# Patient Record
Sex: FEMALE | Race: WHITE | NOT HISPANIC OR LATINO | Employment: OTHER | URBAN - METROPOLITAN AREA
[De-identification: names, ages, dates, MRNs, and addresses within clinical notes are randomized per-mention and may not be internally consistent; named-entity substitution may affect disease eponyms.]

---

## 2017-03-21 RX ORDER — LEVOTHYROXINE SODIUM 0.1 MG/1
100 TABLET ORAL DAILY
Qty: 90 TABLET | Refills: 0 | Status: SHIPPED | OUTPATIENT
Start: 2017-03-21 | End: 2017-06-03 | Stop reason: SDUPTHER

## 2017-03-29 DIAGNOSIS — Z00.00 ROUTINE GENERAL MEDICAL EXAMINATION AT A HEALTH CARE FACILITY: Primary | ICD-10-CM

## 2017-03-30 ENCOUNTER — LAB (OUTPATIENT)
Dept: SPORTS MEDICINE | Facility: CLINIC | Age: 62
End: 2017-03-30

## 2017-03-30 DIAGNOSIS — Z00.00 ROUTINE GENERAL MEDICAL EXAMINATION AT A HEALTH CARE FACILITY: ICD-10-CM

## 2017-03-30 LAB
ALBUMIN SERPL-MCNC: 4 G/DL (ref 3.5–5.2)
ALBUMIN/GLOB SERPL: 1.6 G/DL
ALP SERPL-CCNC: 79 U/L (ref 39–117)
ALT SERPL-CCNC: 13 U/L (ref 1–33)
AST SERPL-CCNC: 18 U/L (ref 1–32)
BASOPHILS # BLD AUTO: 0.01 10*3/MM3 (ref 0–0.2)
BASOPHILS NFR BLD AUTO: 0.2 % (ref 0–1.5)
BILIRUB SERPL-MCNC: 0.4 MG/DL (ref 0.1–1.2)
BUN SERPL-MCNC: 18 MG/DL (ref 8–23)
BUN/CREAT SERPL: 19.6 (ref 7–25)
CALCIUM SERPL-MCNC: 9.4 MG/DL (ref 8.6–10.5)
CHLORIDE SERPL-SCNC: 106 MMOL/L (ref 98–107)
CHOLEST SERPL-MCNC: 232 MG/DL (ref 0–200)
CHOLEST/HDLC SERPL: 4.14 {RATIO}
CO2 SERPL-SCNC: 24.8 MMOL/L (ref 22–29)
CREAT SERPL-MCNC: 0.92 MG/DL (ref 0.57–1)
EOSINOPHIL # BLD AUTO: 0.13 10*3/MM3 (ref 0–0.7)
EOSINOPHIL NFR BLD AUTO: 3 % (ref 0.3–6.2)
ERYTHROCYTE [DISTWIDTH] IN BLOOD BY AUTOMATED COUNT: 14.9 % (ref 11.7–13)
GLOBULIN SER CALC-MCNC: 2.5 GM/DL
GLUCOSE SERPL-MCNC: 86 MG/DL (ref 65–99)
HCT VFR BLD AUTO: 41.2 % (ref 35.6–45.5)
HDLC SERPL-MCNC: 56 MG/DL (ref 40–60)
HGB BLD-MCNC: 12.9 G/DL (ref 11.9–15.5)
IMM GRANULOCYTES # BLD: 0 10*3/MM3 (ref 0–0.03)
IMM GRANULOCYTES NFR BLD: 0 % (ref 0–0.5)
LDLC SERPL CALC-MCNC: 156 MG/DL (ref 0–100)
LYMPHOCYTES # BLD AUTO: 1.08 10*3/MM3 (ref 0.9–4.8)
LYMPHOCYTES NFR BLD AUTO: 25.3 % (ref 19.6–45.3)
MCH RBC QN AUTO: 27.9 PG (ref 26.9–32)
MCHC RBC AUTO-ENTMCNC: 31.3 G/DL (ref 32.4–36.3)
MCV RBC AUTO: 89 FL (ref 80.5–98.2)
MONOCYTES # BLD AUTO: 0.33 10*3/MM3 (ref 0.2–1.2)
MONOCYTES NFR BLD AUTO: 7.7 % (ref 5–12)
NEUTROPHILS # BLD AUTO: 2.72 10*3/MM3 (ref 1.9–8.1)
NEUTROPHILS NFR BLD AUTO: 63.8 % (ref 42.7–76)
PLATELET # BLD AUTO: 225 10*3/MM3 (ref 140–500)
POTASSIUM SERPL-SCNC: 4.4 MMOL/L (ref 3.5–5.2)
PROT SERPL-MCNC: 6.5 G/DL (ref 6–8.5)
RBC # BLD AUTO: 4.63 10*6/MM3 (ref 3.9–5.2)
SODIUM SERPL-SCNC: 143 MMOL/L (ref 136–145)
T4 FREE SERPL-MCNC: 1.41 NG/DL (ref 0.93–1.7)
TRIGL SERPL-MCNC: 98 MG/DL (ref 0–150)
TSH SERPL DL<=0.005 MIU/L-ACNC: 2.47 MIU/ML (ref 0.27–4.2)
VLDLC SERPL CALC-MCNC: 19.6 MG/DL (ref 5–40)
WBC # BLD AUTO: 4.27 10*3/MM3 (ref 4.5–10.7)

## 2017-04-06 ENCOUNTER — OFFICE VISIT (OUTPATIENT)
Dept: SPORTS MEDICINE | Facility: CLINIC | Age: 62
End: 2017-04-06

## 2017-04-06 VITALS
SYSTOLIC BLOOD PRESSURE: 112 MMHG | TEMPERATURE: 97.8 F | OXYGEN SATURATION: 97 % | DIASTOLIC BLOOD PRESSURE: 80 MMHG | BODY MASS INDEX: 28.25 KG/M2 | WEIGHT: 186.4 LBS | HEART RATE: 81 BPM | HEIGHT: 68 IN

## 2017-04-06 DIAGNOSIS — E03.9 ADULT HYPOTHYROIDISM: ICD-10-CM

## 2017-04-06 DIAGNOSIS — Z00.00 HEALTHCARE MAINTENANCE: Primary | ICD-10-CM

## 2017-04-06 PROCEDURE — 99396 PREV VISIT EST AGE 40-64: CPT | Performed by: FAMILY MEDICINE

## 2017-04-06 NOTE — PROGRESS NOTES
"Subjective   Iris Kumar is a 61 y.o. female.   Chief Complaint   Patient presents with   • Annual Exam       History of Present Illness   1.  CPE  2.  No c/o with thyroid medicine    I have reviewed the patient's medical history in detail and updated the computerized patient record.        Review of Systems   Constitutional: Negative for activity change, appetite change, chills, diaphoresis, fatigue, fever and unexpected weight change.   HENT: Negative for congestion, ear pain, postnasal drip, rhinorrhea, sinus pressure, sneezing, sore throat and trouble swallowing.    Eyes: Negative for visual disturbance.   Respiratory: Negative for cough, chest tightness, shortness of breath and wheezing.    Cardiovascular: Negative for chest pain and palpitations.   Gastrointestinal: Negative for abdominal pain, blood in stool, nausea and vomiting.   Endocrine: Negative for cold intolerance, polydipsia, polyphagia and polyuria.   Genitourinary: Negative for dysuria, flank pain, frequency, hematuria and urgency.   Musculoskeletal: Negative for arthralgias, back pain, joint swelling and myalgias.   Skin: Negative for rash.   Allergic/Immunologic: Negative for environmental allergies.   Neurological: Negative for dizziness, syncope, weakness, numbness and headaches.   Hematological: Negative for adenopathy. Does not bruise/bleed easily.   Psychiatric/Behavioral: Negative for agitation, decreased concentration, dysphoric mood, sleep disturbance and suicidal ideas. The patient is not nervous/anxious.    /80  Pulse 81  Temp 97.8 °F (36.6 °C)  Ht 68\" (172.7 cm)  Wt 186 lb 6.4 oz (84.6 kg)  SpO2 97%  BMI 28.34 kg/m2      Objective   Physical Exam   Constitutional: She is oriented to person, place, and time. She appears well-developed and well-nourished.   HENT:   Head: Normocephalic and atraumatic.   Right Ear: External ear normal.   Left Ear: External ear normal.   Nose: Nose normal.   Mouth/Throat: Oropharynx is clear " and moist.   Eyes: Conjunctivae and EOM are normal. Pupils are equal, round, and reactive to light.   Neck: Normal range of motion. Neck supple. No thyromegaly present.   Cardiovascular: Normal rate, regular rhythm and normal heart sounds.    No peripheral edema   Pulmonary/Chest: Effort normal and breath sounds normal.   Neurological: She is alert and oriented to person, place, and time.   Skin: Skin is warm, dry and intact.   Psychiatric: She has a normal mood and affect. Her behavior is normal. Thought content normal.   Vitals reviewed.      Assessment/Plan   Iris was seen today for annual exam.    Diagnoses and all orders for this visit:    Healthcare maintenance    Adult hypothyroidism      Reviewed labs from 3/30/2017, continue her cardiac risk score low at 3.0%.  Thyroid levels normal, continue current dose.  Patient will have screening colonoscopy next year.

## 2017-04-17 RX ORDER — LEVOTHYROXINE SODIUM 0.1 MG/1
TABLET ORAL
Qty: 30 TABLET | Refills: 3 | Status: SHIPPED | OUTPATIENT
Start: 2017-04-17 | End: 2019-01-11 | Stop reason: SDUPTHER

## 2017-06-05 RX ORDER — LEVOTHYROXINE SODIUM 0.1 MG/1
TABLET ORAL
Qty: 90 TABLET | Refills: 3 | Status: SHIPPED | OUTPATIENT
Start: 2017-06-05 | End: 2018-05-13 | Stop reason: SDUPTHER

## 2018-05-14 RX ORDER — LEVOTHYROXINE SODIUM 0.1 MG/1
TABLET ORAL
Qty: 90 TABLET | Refills: 3 | Status: SHIPPED | OUTPATIENT
Start: 2018-05-14 | End: 2019-01-11 | Stop reason: SDUPTHER

## 2018-08-13 ENCOUNTER — AMBULATORY SURGICAL CENTER (AMBULATORY)
Dept: URBAN - METROPOLITAN AREA SURGERY 17 | Facility: SURGERY | Age: 63
End: 2018-08-13
Payer: COMMERCIAL

## 2018-08-13 ENCOUNTER — OFFICE (AMBULATORY)
Dept: URBAN - METROPOLITAN AREA PATHOLOGY 4 | Facility: PATHOLOGY | Age: 63
End: 2018-08-13
Payer: COMMERCIAL

## 2018-08-13 VITALS
RESPIRATION RATE: 20 BRPM | HEART RATE: 63 BPM | RESPIRATION RATE: 15 BRPM | OXYGEN SATURATION: 98 % | OXYGEN SATURATION: 99 % | HEART RATE: 76 BPM | DIASTOLIC BLOOD PRESSURE: 83 MMHG | RESPIRATION RATE: 16 BRPM | HEART RATE: 69 BPM | WEIGHT: 185 LBS | HEART RATE: 84 BPM | OXYGEN SATURATION: 97 % | HEART RATE: 74 BPM | OXYGEN SATURATION: 94 % | DIASTOLIC BLOOD PRESSURE: 71 MMHG | DIASTOLIC BLOOD PRESSURE: 78 MMHG | SYSTOLIC BLOOD PRESSURE: 132 MMHG | HEART RATE: 78 BPM | RESPIRATION RATE: 18 BRPM | DIASTOLIC BLOOD PRESSURE: 69 MMHG | HEART RATE: 80 BPM | SYSTOLIC BLOOD PRESSURE: 115 MMHG | RESPIRATION RATE: 17 BRPM | SYSTOLIC BLOOD PRESSURE: 140 MMHG | HEIGHT: 67 IN | HEART RATE: 81 BPM | TEMPERATURE: 97 F | SYSTOLIC BLOOD PRESSURE: 125 MMHG | HEART RATE: 79 BPM | RESPIRATION RATE: 32 BRPM | SYSTOLIC BLOOD PRESSURE: 136 MMHG | SYSTOLIC BLOOD PRESSURE: 120 MMHG | SYSTOLIC BLOOD PRESSURE: 153 MMHG | TEMPERATURE: 98.6 F | OXYGEN SATURATION: 96 % | RESPIRATION RATE: 24 BRPM | SYSTOLIC BLOOD PRESSURE: 130 MMHG | DIASTOLIC BLOOD PRESSURE: 70 MMHG | SYSTOLIC BLOOD PRESSURE: 111 MMHG | OXYGEN SATURATION: 95 % | DIASTOLIC BLOOD PRESSURE: 57 MMHG | DIASTOLIC BLOOD PRESSURE: 76 MMHG

## 2018-08-13 DIAGNOSIS — K57.30 DIVERTICULOSIS OF LARGE INTESTINE WITHOUT PERFORATION OR ABS: ICD-10-CM

## 2018-08-13 DIAGNOSIS — D12.4 BENIGN NEOPLASM OF DESCENDING COLON: ICD-10-CM

## 2018-08-13 DIAGNOSIS — Z12.11 ENCOUNTER FOR SCREENING FOR MALIGNANT NEOPLASM OF COLON: ICD-10-CM

## 2018-08-13 DIAGNOSIS — K63.5 POLYP OF COLON: ICD-10-CM

## 2018-08-13 LAB
GI HISTOLOGY: A. SELECT: (no result)
GI HISTOLOGY: PDF REPORT: (no result)

## 2018-08-13 PROCEDURE — 88305 TISSUE EXAM BY PATHOLOGIST: CPT

## 2018-08-13 PROCEDURE — 45385 COLONOSCOPY W/LESION REMOVAL: CPT | Mod: 33

## 2018-12-27 DIAGNOSIS — Z13.29 SCREENING FOR THYROID DISORDER: ICD-10-CM

## 2018-12-27 DIAGNOSIS — Z00.00 HEALTH CARE MAINTENANCE: Primary | ICD-10-CM

## 2018-12-27 DIAGNOSIS — Z11.59 ENCOUNTER FOR HEPATITIS C SCREENING TEST FOR LOW RISK PATIENT: ICD-10-CM

## 2019-01-02 ENCOUNTER — RESULTS ENCOUNTER (OUTPATIENT)
Dept: SPORTS MEDICINE | Facility: CLINIC | Age: 64
End: 2019-01-02

## 2019-01-02 DIAGNOSIS — R79.89 ABNORMAL THYROID SCREEN (BLOOD): ICD-10-CM

## 2019-01-02 DIAGNOSIS — Z00.00 ANNUAL PHYSICAL EXAM: Primary | ICD-10-CM

## 2019-01-02 DIAGNOSIS — Z00.00 HEALTH CARE MAINTENANCE: ICD-10-CM

## 2019-01-02 DIAGNOSIS — Z13.29 SCREENING FOR THYROID DISORDER: ICD-10-CM

## 2019-01-02 DIAGNOSIS — Z11.59 ENCOUNTER FOR HEPATITIS C SCREENING TEST FOR LOW RISK PATIENT: ICD-10-CM

## 2019-01-02 DIAGNOSIS — E03.9 ADULT HYPOTHYROIDISM: ICD-10-CM

## 2019-01-02 DIAGNOSIS — Z11.59 NEED FOR HEPATITIS C SCREENING TEST: ICD-10-CM

## 2019-01-02 DIAGNOSIS — Z13.220 SCREENING CHOLESTEROL LEVEL: ICD-10-CM

## 2019-01-03 LAB
ALBUMIN SERPL-MCNC: 4.1 G/DL (ref 3.5–5.2)
ALBUMIN/GLOB SERPL: 1.5 G/DL
ALP SERPL-CCNC: 84 U/L (ref 39–117)
ALT SERPL-CCNC: 16 U/L (ref 1–33)
APPEARANCE UR: CLEAR
AST SERPL-CCNC: 17 U/L (ref 1–32)
BACTERIA #/AREA URNS HPF: ABNORMAL /HPF
BASOPHILS # BLD AUTO: 0.05 10*3/MM3 (ref 0–0.2)
BASOPHILS NFR BLD AUTO: 1.1 % (ref 0–1.5)
BILIRUB SERPL-MCNC: 0.4 MG/DL (ref 0.1–1.2)
BILIRUB UR QL STRIP: NEGATIVE
BUN SERPL-MCNC: 13 MG/DL (ref 8–23)
BUN/CREAT SERPL: 15.3 (ref 7–25)
CALCIUM SERPL-MCNC: 9.5 MG/DL (ref 8.6–10.5)
CHLORIDE SERPL-SCNC: 107 MMOL/L (ref 98–107)
CHOLEST SERPL-MCNC: 209 MG/DL (ref 0–200)
CHOLEST/HDLC SERPL: 4.45 {RATIO}
CO2 SERPL-SCNC: 26 MMOL/L (ref 22–29)
COLOR UR: YELLOW
CREAT SERPL-MCNC: 0.85 MG/DL (ref 0.57–1)
EOSINOPHIL # BLD AUTO: 0.14 10*3/MM3 (ref 0–0.7)
EOSINOPHIL NFR BLD AUTO: 3.1 % (ref 0.3–6.2)
EPI CELLS #/AREA URNS HPF: ABNORMAL /HPF
ERYTHROCYTE [DISTWIDTH] IN BLOOD BY AUTOMATED COUNT: 15.1 % (ref 11.7–13)
GLOBULIN SER CALC-MCNC: 2.7 GM/DL
GLUCOSE SERPL-MCNC: 89 MG/DL (ref 65–99)
GLUCOSE UR QL: NEGATIVE
HCT VFR BLD AUTO: 38.6 % (ref 35.6–45.5)
HCV AB S/CO SERPL IA: <0.1 S/CO RATIO (ref 0–0.9)
HDLC SERPL-MCNC: 47 MG/DL (ref 40–60)
HGB BLD-MCNC: 12.4 G/DL (ref 11.9–15.5)
HGB UR QL STRIP: ABNORMAL
IMM GRANULOCYTES # BLD: 0 10*3/MM3 (ref 0–0.03)
IMM GRANULOCYTES NFR BLD: 0 % (ref 0–0.5)
KETONES UR QL STRIP: NEGATIVE
LDLC SERPL CALC-MCNC: 133 MG/DL (ref 0–100)
LEUKOCYTE ESTERASE UR QL STRIP: NEGATIVE
LYMPHOCYTES # BLD AUTO: 1.03 10*3/MM3 (ref 0.9–4.8)
LYMPHOCYTES NFR BLD AUTO: 22.8 % (ref 19.6–45.3)
MCH RBC QN AUTO: 27.3 PG (ref 26.9–32)
MCHC RBC AUTO-ENTMCNC: 32.1 G/DL (ref 32.4–36.3)
MCV RBC AUTO: 85 FL (ref 80.5–98.2)
MICRO URNS: ABNORMAL
MONOCYTES # BLD AUTO: 0.38 10*3/MM3 (ref 0.2–1.2)
MONOCYTES NFR BLD AUTO: 8.4 % (ref 5–12)
MUCOUS THREADS URNS QL MICRO: PRESENT /HPF
NEUTROPHILS # BLD AUTO: 2.91 10*3/MM3 (ref 1.9–8.1)
NEUTROPHILS NFR BLD AUTO: 64.6 % (ref 42.7–76)
NITRITE UR QL STRIP: NEGATIVE
PH UR STRIP: 7 [PH] (ref 5–7.5)
PLATELET # BLD AUTO: 249 10*3/MM3 (ref 140–500)
POTASSIUM SERPL-SCNC: 4.6 MMOL/L (ref 3.5–5.2)
PROT SERPL-MCNC: 6.8 G/DL (ref 6–8.5)
PROT UR QL STRIP: NEGATIVE
RBC # BLD AUTO: 4.54 10*6/MM3 (ref 3.9–5.2)
RBC #/AREA URNS HPF: ABNORMAL /HPF
SODIUM SERPL-SCNC: 141 MMOL/L (ref 136–145)
SP GR UR: 1.02 (ref 1–1.03)
T4 FREE SERPL-MCNC: 1.44 NG/DL (ref 0.93–1.7)
TRIGL SERPL-MCNC: 145 MG/DL (ref 0–150)
TSH SERPL DL<=0.005 MIU/L-ACNC: 4.48 MIU/ML (ref 0.27–4.2)
URINALYSIS REFLEX: ABNORMAL
UROBILINOGEN UR STRIP-MCNC: 0.2 MG/DL (ref 0.2–1)
VLDLC SERPL CALC-MCNC: 29 MG/DL (ref 5–40)
WBC # BLD AUTO: 4.51 10*3/MM3 (ref 4.5–10.7)
WBC #/AREA URNS HPF: ABNORMAL /HPF

## 2019-01-11 ENCOUNTER — OFFICE VISIT (OUTPATIENT)
Dept: SPORTS MEDICINE | Facility: CLINIC | Age: 64
End: 2019-01-11

## 2019-01-11 VITALS
OXYGEN SATURATION: 97 % | WEIGHT: 190 LBS | DIASTOLIC BLOOD PRESSURE: 82 MMHG | HEART RATE: 74 BPM | TEMPERATURE: 97.7 F | HEIGHT: 68 IN | SYSTOLIC BLOOD PRESSURE: 130 MMHG | BODY MASS INDEX: 28.79 KG/M2

## 2019-01-11 DIAGNOSIS — Z00.00 PREVENTATIVE HEALTH CARE: Primary | ICD-10-CM

## 2019-01-11 DIAGNOSIS — E03.9 ADULT HYPOTHYROIDISM: ICD-10-CM

## 2019-01-11 DIAGNOSIS — R31.29 MICROSCOPIC HEMATURIA: ICD-10-CM

## 2019-01-11 PROBLEM — Q61.5 SPONGE KIDNEY: Status: ACTIVE | Noted: 2019-01-11

## 2019-01-11 PROCEDURE — 99396 PREV VISIT EST AGE 40-64: CPT | Performed by: FAMILY MEDICINE

## 2019-01-11 RX ORDER — LEVOTHYROXINE SODIUM 112 UG/1
112 TABLET ORAL DAILY
Qty: 90 TABLET | Refills: 3 | Status: SHIPPED | OUTPATIENT
Start: 2019-01-11 | End: 2020-01-06 | Stop reason: SDUPTHER

## 2019-01-11 RX ORDER — LEVOTHYROXINE SODIUM 112 UG/1
112 TABLET ORAL DAILY
Qty: 90 TABLET | Refills: 3 | Status: SHIPPED | OUTPATIENT
Start: 2019-01-11 | End: 2019-01-11 | Stop reason: SDUPTHER

## 2019-01-11 NOTE — PROGRESS NOTES
Iris Kumar is here today for an annual physical exam.     Eating a healthy diet. Exercising routinely.       I have reviewed the patient's medical, family, and social history in detail and updated the computerized patient record.    Screening history:  Colonoscopy - due 2023  Breast cancer, Pap/pelvic - per GYN  Metabolic - last year    Health Maintenance   Topic Date Due   • HEPATITIS A VACCINE ADULT (1 of 2) 09[birth year]/1973   • PAP SMEAR  04/06/2017   • ZOSTER VACCINE (2 of 2) 06/01/2017   • ANNUAL PHYSICAL  04/07/2018   • INFLUENZA VACCINE  08/01/2018   • MAMMOGRAM  11/14/2020   • COLONOSCOPY  08/13/2023   • TDAP/TD VACCINES (2 - Td) 04/06/2027   • HEPATITIS C SCREENING  Completed       Review of Systems   Constitutional: Negative for activity change, appetite change, chills, diaphoresis, fatigue, fever and unexpected weight change.   HENT: Negative for congestion, ear pain, postnasal drip, rhinorrhea, sinus pressure, sneezing, sore throat and trouble swallowing.    Eyes: Negative for visual disturbance.   Respiratory: Negative for cough, chest tightness, shortness of breath and wheezing.    Cardiovascular: Negative for chest pain and palpitations.   Gastrointestinal: Negative for abdominal pain, blood in stool, nausea and vomiting.   Endocrine: Negative for cold intolerance, polydipsia, polyphagia and polyuria.   Genitourinary: Negative for dysuria, flank pain, frequency, hematuria and urgency.   Musculoskeletal: Negative for arthralgias, back pain, joint swelling and myalgias.   Skin: Negative for rash.   Allergic/Immunologic: Negative for environmental allergies.   Neurological: Negative for dizziness, syncope, weakness, numbness and headaches.   Hematological: Negative for adenopathy. Does not bruise/bleed easily.   Psychiatric/Behavioral: Negative for agitation, decreased concentration, dysphoric mood, sleep disturbance and suicidal ideas. The patient is not nervous/anxious.        /82 (BP Location:  "Left arm, Patient Position: Sitting, Cuff Size: Adult)   Pulse 74   Temp 97.7 °F (36.5 °C) (Oral)   Ht 172.7 cm (67.99\")   Wt 86.2 kg (190 lb)   SpO2 97%   BMI 28.90 kg/m²      Physical Exam    Vital signs reviewed.  General appearance: No acute distress  Eyes: conjunctiva clear without erythema; pupils equally round and reactive  ENT: external ears and nose normal; hearing normal, oropharynx clear  Neck: supple; no thyromegaly  CV: normal rate and rhythm; no peripheral edema  Respiratory: normal respiratory effort; lungs clear to auscultation bilaterally  MSK: normal gait and station; no focal joint deformity or swelling  Skin: no rash or wounds; normal turgor  Neuro: cranial nerves 2-12 grossly intact; normal sensation to light touch  Psych: mood and affect normal; recent and remote memory intact    Results Encounter on 01/02/2019   Component Date Value Ref Range Status   • WBC 01/02/2019 4.51  4.50 - 10.70 10*3/mm3 Final   • RBC 01/02/2019 4.54  3.90 - 5.20 10*6/mm3 Final   • Hemoglobin 01/02/2019 12.4  11.9 - 15.5 g/dL Final   • Hematocrit 01/02/2019 38.6  35.6 - 45.5 % Final   • MCV 01/02/2019 85.0  80.5 - 98.2 fL Final   • MCH 01/02/2019 27.3  26.9 - 32.0 pg Final   • MCHC 01/02/2019 32.1* 32.4 - 36.3 g/dL Final   • RDW 01/02/2019 15.1* 11.7 - 13.0 % Final   • Platelets 01/02/2019 249  140 - 500 10*3/mm3 Final   • Neutrophil Rel % 01/02/2019 64.6  42.7 - 76.0 % Final   • Lymphocyte Rel % 01/02/2019 22.8  19.6 - 45.3 % Final   • Monocyte Rel % 01/02/2019 8.4  5.0 - 12.0 % Final   • Eosinophil Rel % 01/02/2019 3.1  0.3 - 6.2 % Final   • Basophil Rel % 01/02/2019 1.1  0.0 - 1.5 % Final   • Neutrophils Absolute 01/02/2019 2.91  1.90 - 8.10 10*3/mm3 Final   • Lymphocytes Absolute 01/02/2019 1.03  0.90 - 4.80 10*3/mm3 Final   • Monocytes Absolute 01/02/2019 0.38  0.20 - 1.20 10*3/mm3 Final   • Eosinophils Absolute 01/02/2019 0.14  0.00 - 0.70 10*3/mm3 Final   • Basophils Absolute 01/02/2019 0.05  0.00 - 0.20 " 10*3/mm3 Final   • Immature Granulocyte Rel % 01/02/2019 0.0  0.0 - 0.5 % Final   • Immature Grans Absolute 01/02/2019 0.00  0.00 - 0.03 10*3/mm3 Final   • Glucose 01/02/2019 89  65 - 99 mg/dL Final   • BUN 01/02/2019 13  8 - 23 mg/dL Final   • Creatinine 01/02/2019 0.85  0.57 - 1.00 mg/dL Final   • eGFR Non  Am 01/02/2019 68  >60 mL/min/1.73 Final   • eGFR African Am 01/02/2019 82  >60 mL/min/1.73 Final   • BUN/Creatinine Ratio 01/02/2019 15.3  7.0 - 25.0 Final   • Sodium 01/02/2019 141  136 - 145 mmol/L Final   • Potassium 01/02/2019 4.6  3.5 - 5.2 mmol/L Final   • Chloride 01/02/2019 107  98 - 107 mmol/L Final   • Total CO2 01/02/2019 26.0  22.0 - 29.0 mmol/L Final   • Calcium 01/02/2019 9.5  8.6 - 10.5 mg/dL Final   • Total Protein 01/02/2019 6.8  6.0 - 8.5 g/dL Final   • Albumin 01/02/2019 4.10  3.50 - 5.20 g/dL Final   • Globulin 01/02/2019 2.7  gm/dL Final   • A/G Ratio 01/02/2019 1.5  g/dL Final   • Total Bilirubin 01/02/2019 0.4  0.1 - 1.2 mg/dL Final   • Alkaline Phosphatase 01/02/2019 84  39 - 117 U/L Final   • AST (SGOT) 01/02/2019 17  1 - 32 U/L Final   • ALT (SGPT) 01/02/2019 16  1 - 33 U/L Final   • Hep C Virus Ab 01/02/2019 <0.1  0.0 - 0.9 s/co ratio Final    Comment:                                   Negative:     < 0.8                               Indeterminate: 0.8 - 0.9                                    Positive:     > 0.9   The CDC recommends that a positive HCV antibody result   be followed up with a HCV Nucleic Acid Amplification   test ([id number]).     • Total Cholesterol 01/02/2019 209* 0 - 200 mg/dL Final   • Triglycerides 01/02/2019 145  0 - 150 mg/dL Final   • HDL Cholesterol 01/02/2019 47  40 - 60 mg/dL Final   • VLDL Cholesterol 01/02/2019 29  5 - 40 mg/dL Final   • LDL Cholesterol  01/02/2019 133* 0 - 100 mg/dL Final   • Chol/HDL Ratio 01/02/2019 4.45   Final   • Free T4 01/02/2019 1.44  0.93 - 1.70 ng/dL Final   • TSH 01/02/2019 4.480* 0.270 - 4.200 mIU/mL Final   • Specific  Gravity, UA 01/02/2019 1.016  1.005 - 1.030 Final   • pH, UA 01/02/2019 7.0  5.0 - 7.5 Final   • Color, UA 01/02/2019 Yellow  Yellow Final   • Appearance, UA 01/02/2019 Clear  Clear Final   • Leukocytes, UA 01/02/2019 Negative  Negative Final   • Protein 01/02/2019 Negative  Negative/Trace Final   • Glucose, UA 01/02/2019 Negative  Negative Final   • Ketones 01/02/2019 Negative  Negative Final   • Blood, UA 01/02/2019 Trace* Negative Final   • Bilirubin, UA 01/02/2019 Negative  Negative Final   • Urobilinogen, UA 01/02/2019 0.2  0.2 - 1.0 mg/dL Final   • Nitrite, UA 01/02/2019 Negative  Negative Final   • Microscopic Examination 01/02/2019 See below:   Final    Microscopic was indicated and was performed.   • Urinalysis Reflex 01/02/2019 Comment   Final    This specimen will not reflex to a Urine Culture.   • WBC, UA 01/02/2019 0-5  0 - 5 /hpf Final   • RBC, UA 01/02/2019 3-10* 0 - 2 /hpf Final   • Epithelial Cells (non renal) 01/02/2019 0-10  0 - 10 /hpf Final   • Mucus, UA 01/02/2019 Present  Not Estab. /hpf Final   • Bacteria, UA 01/02/2019 Few  None seen/Few /hpf Final         Current Outpatient Medications:   •  Cholecalciferol (VITAMIN D-3) 1000 UNITS capsule, Take by mouth., Disp: , Rfl:   •  levothyroxine (SYNTHROID, LEVOTHROID) 112 MCG tablet, Take 1 tablet by mouth Daily., Disp: 90 tablet, Rfl: 3  •  sodium bicarbonate 325 MG tablet, Take by mouth., Disp: , Rfl:     Diagnoses and all orders for this visit:    Preventative health care    Adult hypothyroidism  -     levothyroxine (SYNTHROID, LEVOTHROID) 112 MCG tablet; Take 1 tablet by mouth Daily.  -     TSH; Future  -     T4, Free; Future    Microscopic hematuria      1.  We'll increase levothyroxine to 112 µg by mouth daily then plan to repeat TSH and free T4 in 4-6 weeks.  2.  Microscopic hematuria, patient has a history of sponge kidney, was to see her urologist yearly but she is behind on this.  She states she'll make an appointment with her  urologist.    Encourage healthy diet and exercise.  Encourage patient to stay up to date on screening examinations as indicated based on age and risk factors.    EMR Dragon/Transcription disclaimer:    Much of this encounter note is an electronic transcription/translation of spoken language to printed text.  The electronic translation of spoken language may permit erroneous, or at times, nonsensical words or phrases to be inadvertently transcribed.  Although I have reviewed the note for such errors some may still exist.

## 2019-02-11 ENCOUNTER — RESULTS ENCOUNTER (OUTPATIENT)
Dept: SPORTS MEDICINE | Facility: CLINIC | Age: 64
End: 2019-02-11

## 2019-02-11 DIAGNOSIS — E03.9 ADULT HYPOTHYROIDISM: ICD-10-CM

## 2019-03-13 LAB
T4 FREE SERPL-MCNC: 1.43 NG/DL (ref 0.93–1.7)
TSH SERPL DL<=0.005 MIU/L-ACNC: 1.48 MIU/ML (ref 0.27–4.2)

## 2019-03-20 ENCOUNTER — TELEPHONE (OUTPATIENT)
Dept: SPORTS MEDICINE | Facility: CLINIC | Age: 64
End: 2019-03-20

## 2020-01-06 DIAGNOSIS — E03.9 ADULT HYPOTHYROIDISM: Primary | ICD-10-CM

## 2020-01-06 RX ORDER — LEVOTHYROXINE SODIUM 0.1 MG/1
100 TABLET ORAL DAILY
Qty: 30 TABLET | Refills: 11 | Status: SHIPPED | OUTPATIENT
Start: 2020-01-06 | End: 2020-06-25 | Stop reason: SDUPTHER

## 2020-06-25 DIAGNOSIS — E03.9 ADULT HYPOTHYROIDISM: ICD-10-CM

## 2020-06-25 RX ORDER — LEVOTHYROXINE SODIUM 0.1 MG/1
100 TABLET ORAL DAILY
Qty: 30 TABLET | Refills: 11 | Status: SHIPPED | OUTPATIENT
Start: 2020-06-25 | End: 2021-02-26 | Stop reason: SDUPTHER

## 2021-02-18 DIAGNOSIS — Z00.00 MEDICARE ANNUAL WELLNESS VISIT, INITIAL: Primary | ICD-10-CM

## 2021-02-26 ENCOUNTER — OFFICE VISIT (OUTPATIENT)
Dept: SPORTS MEDICINE | Facility: CLINIC | Age: 66
End: 2021-02-26

## 2021-02-26 VITALS
HEART RATE: 75 BPM | SYSTOLIC BLOOD PRESSURE: 130 MMHG | WEIGHT: 189 LBS | OXYGEN SATURATION: 97 % | HEIGHT: 68 IN | BODY MASS INDEX: 28.64 KG/M2 | DIASTOLIC BLOOD PRESSURE: 72 MMHG | TEMPERATURE: 98.1 F

## 2021-02-26 DIAGNOSIS — E03.9 ADULT HYPOTHYROIDISM: ICD-10-CM

## 2021-02-26 DIAGNOSIS — Z00.00 PREVENTATIVE HEALTH CARE: Primary | ICD-10-CM

## 2021-02-26 DIAGNOSIS — E78.00 PURE HYPERCHOLESTEROLEMIA: Chronic | ICD-10-CM

## 2021-02-26 PROCEDURE — 99397 PER PM REEVAL EST PAT 65+ YR: CPT | Performed by: FAMILY MEDICINE

## 2021-02-26 PROCEDURE — 90732 PPSV23 VACC 2 YRS+ SUBQ/IM: CPT | Performed by: FAMILY MEDICINE

## 2021-02-26 PROCEDURE — 90471 IMMUNIZATION ADMIN: CPT | Performed by: FAMILY MEDICINE

## 2021-02-26 RX ORDER — LEVOTHYROXINE SODIUM 0.1 MG/1
100 TABLET ORAL DAILY
Qty: 90 TABLET | Refills: 3 | Status: SHIPPED | OUTPATIENT
Start: 2021-02-26 | End: 2022-04-12

## 2021-02-26 RX ORDER — ROSUVASTATIN CALCIUM 10 MG/1
10 TABLET, COATED ORAL DAILY
Qty: 90 TABLET | Refills: 3 | Status: SHIPPED | OUTPATIENT
Start: 2021-02-26 | End: 2022-05-16 | Stop reason: SDUPTHER

## 2021-02-26 RX ORDER — PHENOL 1.4 %
500 AEROSOL, SPRAY (ML) MUCOUS MEMBRANE 2 TIMES DAILY WITH MEALS
COMMUNITY

## 2021-02-26 NOTE — PROGRESS NOTES
"Iris Kumar is here today for an annual physical exam.     Eating a healthy diet. Exercising routinely.     PHQ-2 Depression Screening  Little interest or pleasure in doing things?  0   Feeling down, depressed, or hopeless?  0   PHQ-2 Total Score  0         I have reviewed the patient's medical, family, and social history in detail and updated the computerized patient record.    Screening history:  Colonoscopy -   Breast cancer, Pap/pelvic - per GYN  Metabolic - last year    Health Maintenance   Topic Date Due   • PAP SMEAR  04/06/2017   • ZOSTER VACCINE (2 of 2) 06/01/2017   • ANNUAL PHYSICAL  01/12/2020   • LIPID PANEL  02/19/2022   • MAMMOGRAM  01/25/2023   • DXA SCAN  02/09/2023   • COLONOSCOPY  08/13/2023   • Pneumococcal Vaccine 65+ (1 of 1 - PPSV23) 02/26/2026   • TDAP/TD VACCINES (2 - Td) 04/06/2027   • HEPATITIS C SCREENING  Completed   • INFLUENZA VACCINE  Completed   • MENINGOCOCCAL VACCINE  Aged Out       Review of Systems   Constitutional: Negative.    HENT: Negative.    Respiratory: Negative.    Cardiovascular: Negative.    Gastrointestinal: Negative.    Genitourinary: Negative.    Neurological: Negative.    Psychiatric/Behavioral: Negative.        /72 (BP Location: Left arm, Patient Position: Sitting, Cuff Size: Adult)   Pulse 75   Temp 98.1 °F (36.7 °C) (Oral)   Ht 172.7 cm (67.99\")   Wt 85.7 kg (189 lb)   SpO2 97%   BMI 28.74 kg/m²      Physical Exam    Vital signs reviewed.  General appearance: No acute distress  Eyes: conjunctiva clear without erythema; pupils equally round and reactive  ENT: external ears normal; hearing normal  Neck: supple; no thyromegaly  CV: normal rate and rhythm; no peripheral edema  Respiratory: normal respiratory effort; lungs clear to auscultation bilaterally  MSK: normal gait and station; no focal joint deformity or swelling  Skin: no rash or wounds; normal turgor  Neuro: cranial nerves 2-12 grossly intact; normal sensation to light touch  Psych: mood " and affect normal; recent and remote memory intact    Results Encounter on 02/19/2021   Component Date Value Ref Range Status   • Glucose 02/19/2021 88  65 - 99 mg/dL Final   • BUN 02/19/2021 12  8 - 23 mg/dL Final   • Creatinine 02/19/2021 0.83  0.57 - 1.00 mg/dL Final   • eGFR Non  Am 02/19/2021 69  >60 mL/min/1.73 Final    Comment: GFR Normal >60  Chronic Kidney Disease <60  Kidney Failure <15     • eGFR  Am 02/19/2021 84  >60 mL/min/1.73 Final   • BUN/Creatinine Ratio 02/19/2021 14.5  7.0 - 25.0 Final   • Sodium 02/19/2021 139  136 - 145 mmol/L Final   • Potassium 02/19/2021 4.4  3.5 - 5.2 mmol/L Final   • Chloride 02/19/2021 106  98 - 107 mmol/L Final   • Total CO2 02/19/2021 26.6  22.0 - 29.0 mmol/L Final   • Calcium 02/19/2021 9.2  8.6 - 10.5 mg/dL Final   • Total Protein 02/19/2021 6.1  6.0 - 8.5 g/dL Final   • Albumin 02/19/2021 3.80  3.50 - 5.20 g/dL Final   • Globulin 02/19/2021 2.3  gm/dL Final   • A/G Ratio 02/19/2021 1.7  g/dL Final   • Total Bilirubin 02/19/2021 0.4  0.0 - 1.2 mg/dL Final   • Alkaline Phosphatase 02/19/2021 89  39 - 117 U/L Final   • AST (SGOT) 02/19/2021 17  1 - 32 U/L Final   • ALT (SGPT) 02/19/2021 11  1 - 33 U/L Final   • Hemoglobin A1C 02/19/2021 5.50  4.80 - 5.60 % Final    Comment: Hemoglobin A1C Ranges:  Increased Risk for Diabetes  5.7% to 6.4%  Diabetes                     >= 6.5%  Diabetic Goal                < 7.0%     • Total Cholesterol 02/19/2021 200  0 - 200 mg/dL Final    Comment: Cholesterol Reference Ranges  (U.S. Department of Health and Human Services ATP III  Classifications)  Desirable          <200 mg/dL  Borderline High    200-239 mg/dL  High Risk          >240 mg/dL  Triglyceride Reference Ranges  (U.S. Department of Health and Human Services ATP III  Classifications)  Normal           <150 mg/dL  Borderline High  150-199 mg/dL  High             200-499 mg/dL  Very High        >500 mg/dL  HDL Reference Ranges  (U.S. Department of Health and  Human Services ATP III  Classifcations)  Low     <40 mg/dl (major risk factor for CHD)  High    >60 mg/dl ('negative' risk factor for CHD)  LDL Reference Ranges  (U.S. Department of Health and Human Services ATP III  Classifcations)  Optimal          <100 mg/dL  Near Optimal     100-129 mg/dL  Borderline High  130-159 mg/dL  High             160-189 mg/dL  Very High        >189 mg/dL     • Triglycerides 02/19/2021 130  0 - 150 mg/dL Final   • HDL Cholesterol 02/19/2021 40  40 - 60 mg/dL Final   • VLDL Cholesterol Ac 02/19/2021 24  5 - 40 mg/dL Final   • LDL Chol Calc ([facility]) 02/19/2021 136* 0 - 100 mg/dL Final   • Chol/HDL Ratio 02/19/2021 5.00   Final   • WBC 02/19/2021 4.72  3.40 - 10.80 10*3/mm3 Final   • RBC 02/19/2021 4.40  3.77 - 5.28 10*6/mm3 Final   • Hemoglobin 02/19/2021 12.2  12.0 - 15.9 g/dL Final   • Hematocrit 02/19/2021 37.7  34.0 - 46.6 % Final   • MCV 02/19/2021 85.7  79.0 - 97.0 fL Final   • MCH 02/19/2021 27.7  26.6 - 33.0 pg Final   • MCHC 02/19/2021 32.4  31.5 - 35.7 g/dL Final   • RDW 02/19/2021 14.0  12.3 - 15.4 % Final   • Platelets 02/19/2021 250  140 - 450 10*3/mm3 Final   • Neutrophil Rel % 02/19/2021 66.7  42.7 - 76.0 % Final   • Lymphocyte Rel % 02/19/2021 21.2  19.6 - 45.3 % Final   • Monocyte Rel % 02/19/2021 8.3  5.0 - 12.0 % Final   • Eosinophil Rel % 02/19/2021 2.8  0.3 - 6.2 % Final   • Basophil Rel % 02/19/2021 0.8  0.0 - 1.5 % Final   • Neutrophils Absolute 02/19/2021 3.15  1.70 - 7.00 10*3/mm3 Final   • Lymphocytes Absolute 02/19/2021 1.00  0.70 - 3.10 10*3/mm3 Final   • Monocytes Absolute 02/19/2021 0.39  0.10 - 0.90 10*3/mm3 Final   • Eosinophils Absolute 02/19/2021 0.13  0.00 - 0.40 10*3/mm3 Final   • Basophils Absolute 02/19/2021 0.04  0.00 - 0.20 10*3/mm3 Final   • Immature Granulocyte Rel % 02/19/2021 0.2  0.0 - 0.5 % Final   • Immature Grans Absolute 02/19/2021 0.01  0.00 - 0.05 10*3/mm3 Final   • nRBC 02/19/2021 0.0  0.0 - 0.2 /100 WBC Final   • Free T4 02/19/2021 1.37   0.93 - 1.70 ng/dL Final    Results may be falsely increased if patient taking Biotin.   • TSH 02/19/2021 1.750  0.270 - 4.200 uIU/mL Final   • Specific Gravity, UA 02/19/2021 1.007  1.005 - 1.030 Final   • pH, UA 02/19/2021 7.5  5.0 - 7.5 Final   • Color, UA 02/19/2021 Yellow  Yellow Final   • Appearance, UA 02/19/2021 Clear  Clear Final   • Leukocytes, UA 02/19/2021 Negative  Negative Final   • Protein 02/19/2021 Negative  Negative/Trace Final   • Glucose, UA 02/19/2021 Negative  Negative Final   • Ketones 02/19/2021 Negative  Negative Final   • Blood, UA 02/19/2021 Negative  Negative Final   • Bilirubin, UA 02/19/2021 Negative  Negative Final   • Urobilinogen, UA 02/19/2021 0.2  0.2 - 1.0 mg/dL Final   • Nitrite, UA 02/19/2021 Negative  Negative Final   • Microscopic Examination 02/19/2021 Comment   Final    Microscopic follows if indicated.   • Microscopic Examination 02/19/2021 See below:   Final    Microscopic was indicated and was performed.   • Urinalysis Reflex 02/19/2021 Comment   Final    This specimen will not reflex to a Urine Culture.   • Microalbumin, Urine 02/19/2021 <3.0  Not Estab. ug/mL Final    **Verified by repeat analysis**   • WBC, UA 02/19/2021 None seen  0 - 5 /hpf Final   • RBC, UA 02/19/2021 None seen  0 - 2 /hpf Final   • Epithelial Cells (non renal) 02/19/2021 0-10  0 - 10 /hpf Final   • Mucus, UA 02/19/2021 Present  Not Estab. /hpf Final   • Bacteria, UA 02/19/2021 None seen  None seen/Few /hpf Final         Current Outpatient Medications:   •  calcium carbonate (OS-HECTOR) 600 MG tablet, Take 750 mg by mouth Daily., Disp: , Rfl:   •  Cholecalciferol (VITAMIN D-3) 1000 UNITS capsule, Take by mouth., Disp: , Rfl:   •  levothyroxine (Synthroid) 100 MCG tablet, Take 1 tablet by mouth Daily., Disp: 90 tablet, Rfl: 3  •  sodium bicarbonate 325 MG tablet, Take by mouth., Disp: , Rfl:   •  rosuvastatin (CRESTOR) 10 MG tablet, Take 1 tablet by mouth Daily., Disp: 90 tablet, Rfl: 3    Diagnoses and  all orders for this visit:    1. Preventative health care (Primary)  -     Pneumococcal Polysaccharide Vaccine 23-Valent (PPSV23) Greater Than or Equal To 1yo Subcutaneous / IM    2. Pure hypercholesterolemia  -     rosuvastatin (CRESTOR) 10 MG tablet; Take 1 tablet by mouth Daily.  Dispense: 90 tablet; Refill: 3  -     Lipid Panel With / Chol / HDL Ratio; Future  -     Comprehensive Metabolic Panel; Future    3. Adult hypothyroidism  -     levothyroxine (Synthroid) 100 MCG tablet; Take 1 tablet by mouth Daily.  Dispense: 90 tablet; Refill: 3      Treat Cholesterol with Crestor 10 mg    Pneumovax today    Repeat lipid and CMP in 4-5 weeks.    Shingrix d/w patient      Encourage healthy diet and exercise.  Encourage patient to stay up to date on screening examinations as indicated based on age and risk factors.    EMR Dragon/Transcription disclaimer:    Much of this encounter note is an electronic transcription/translation of spoken language to printed text.  The electronic translation of spoken language may permit erroneous, or at times, nonsensical words or phrases to be inadvertently transcribed.  Although I have reviewed the note for such errors some may still exist.

## 2022-04-11 DIAGNOSIS — E03.9 ADULT HYPOTHYROIDISM: ICD-10-CM

## 2022-04-12 RX ORDER — LEVOTHYROXINE SODIUM 100 MCG
TABLET ORAL
Qty: 90 TABLET | Refills: 0 | Status: SHIPPED | OUTPATIENT
Start: 2022-04-12 | End: 2022-05-16 | Stop reason: SDUPTHER

## 2022-04-25 ENCOUNTER — TELEPHONE (OUTPATIENT)
Dept: SPORTS MEDICINE | Facility: CLINIC | Age: 67
End: 2022-04-25

## 2022-04-25 DIAGNOSIS — Z12.31 ENCOUNTER FOR SCREENING MAMMOGRAM FOR MALIGNANT NEOPLASM OF BREAST: Primary | ICD-10-CM

## 2022-04-25 NOTE — TELEPHONE ENCOUNTER
Caller: LEYLA SALDIVAR)      Best call back number: [phone]    What form or medical record are you requesting: MAMMOGRAM ORDER       How would you like to receive the form or medical records (pick-up, mail, fax):   If fax, what is the fax number: [phone]

## 2022-04-25 NOTE — TELEPHONE ENCOUNTER
Eva, with [facility] called requesting order for Bilateral mammo diagnostic screening. Pt is scheduled for tomorrow.    Please advise    Fax : [phone]

## 2022-04-26 DIAGNOSIS — Z12.31 ENCOUNTER FOR SCREENING MAMMOGRAM FOR MALIGNANT NEOPLASM OF BREAST: ICD-10-CM

## 2022-04-27 NOTE — PROGRESS NOTES
Called patient via phone and verbally relayed results and recommendations per Dr. Montaño. All information was verbally understood by the patient.     Thank you  Saima

## 2022-05-03 ENCOUNTER — OFFICE VISIT (OUTPATIENT)
Dept: FAMILY MEDICINE CLINIC | Facility: CLINIC | Age: 67
End: 2022-05-03

## 2022-05-03 VITALS
SYSTOLIC BLOOD PRESSURE: 132 MMHG | HEART RATE: 82 BPM | BODY MASS INDEX: 29.16 KG/M2 | WEIGHT: 192.4 LBS | OXYGEN SATURATION: 97 % | TEMPERATURE: 98.2 F | HEIGHT: 68 IN | DIASTOLIC BLOOD PRESSURE: 84 MMHG

## 2022-05-03 DIAGNOSIS — Q61.5 SPONGE KIDNEY: ICD-10-CM

## 2022-05-03 DIAGNOSIS — E03.9 ACQUIRED HYPOTHYROIDISM: Primary | ICD-10-CM

## 2022-05-03 DIAGNOSIS — E78.00 PURE HYPERCHOLESTEROLEMIA: ICD-10-CM

## 2022-05-03 PROCEDURE — 99203 OFFICE O/P NEW LOW 30 MIN: CPT | Performed by: NURSE PRACTITIONER

## 2022-05-03 NOTE — PROGRESS NOTES
"Chief Complaint  Establish Care (New pt)    Subjective          Iris Kumar presents to [facility]  History of Present Illness new patient here to establish care for hyperlipidemia and hypothyroidism.  Considers herself to be overall healthy.  She is single.  Has 1 daughter and 2 rescue dogs she adores.  She tries to follow healthy diet and is active.    She is taking Synthroid which she was originally started on.  Does not think she particularly had any problems with levothyroxine.  TSH has been stable.  She has no signs or symptoms of hypo or hyperthyroidism.    She recently developed hyperlipidemia and has been taking rosuvastatin without myalgia.    She has a history of microscopic hematuria.  Has seen urology a couple times over the years and has never had any known problems.  She had a complete work-up that was reportedly normal.  She does have a history of sponge kidney and was followed by nephrology but diagnosed so long ago and stable she has not seen nephrology.  She manages metabolic acidosis with calcium carbonate.    Had shingles vaccine done-jan and April.     Objective   Vital Signs:   /84   Pulse 82   Temp 98.2 °F (36.8 °C)   Ht 172.7 cm (68\")   Wt 87.3 kg (192 lb 6.4 oz)   SpO2 97%   BMI 29.25 kg/m²     Physical Exam  Vitals and nursing note reviewed.   Constitutional:       General: She is not in acute distress.     Appearance: She is well-developed. She is not ill-appearing or diaphoretic.   HENT:      Head: Normocephalic and atraumatic.   Eyes:      General:         Right eye: No discharge.         Left eye: No discharge.      Conjunctiva/sclera: Conjunctivae normal.   Cardiovascular:      Rate and Rhythm: Normal rate and regular rhythm.      Heart sounds: Normal heart sounds.   Pulmonary:      Effort: Pulmonary effort is normal.      Breath sounds: Normal breath sounds.   Abdominal:      General: Bowel sounds are normal.      Palpations: Abdomen is " soft.      Tenderness: There is no abdominal tenderness.   Musculoskeletal:         General: No deformity.      Comments: Gait smooth and steady   Skin:     General: Skin is warm and dry.   Neurological:      General: No focal deficit present.      Mental Status: She is alert and oriented to person, place, and time.   Psychiatric:         Attention and Perception: Attention and perception normal.         Mood and Affect: Mood and affect normal.         Speech: Speech normal.         Behavior: Behavior normal. Behavior is cooperative.         Thought Content: Thought content normal.         Cognition and Memory: Cognition normal.      Comments: Very pleasant, conversant, excellent medical historian        Result Review :                 Assessment and Plan    Diagnoses and all orders for this visit:    1. Acquired hypothyroidism (Primary)  -     TSH; Future    2. Pure hypercholesterolemia  -     Lipid Panel With LDL / HDL Ratio; Future    3. Sponge kidney  -     CBC & Differential; Future  -     Comprehensive Metabolic Panel; Future      Patient will return in a couple weeks for AWV and lab work.  Reviewed previous PCP labs and notes.  Her CMP showed normal electrolytes as well as renal function.  Liver function was normal.  Lipid panel seem to show significant improvement in lipids on rosuvastatin.  Most recent urinalysis did not show hematuria.  Her most recent TSH about a year ago was therapeutic.  Prior to that it was low.  She had a normal CBC.  Would continue current medications until next lab draw and adjust based on labs.               Follow Up   No follow-ups on file.  Patient was given instructions and counseling regarding her condition or for health maintenance advice. Please see specific information pulled into the AVS if appropriate.

## 2022-05-13 DIAGNOSIS — Q61.5 SPONGE KIDNEY: ICD-10-CM

## 2022-05-13 DIAGNOSIS — E78.00 PURE HYPERCHOLESTEROLEMIA: ICD-10-CM

## 2022-05-13 DIAGNOSIS — E03.9 ACQUIRED HYPOTHYROIDISM: ICD-10-CM

## 2022-05-14 LAB
ALBUMIN SERPL-MCNC: 4 G/DL (ref 3.8–4.8)
ALBUMIN/GLOB SERPL: 1.7 {RATIO} (ref 1.2–2.2)
ALP SERPL-CCNC: 75 IU/L (ref 44–121)
ALT SERPL-CCNC: 15 IU/L (ref 0–32)
AST SERPL-CCNC: 19 IU/L (ref 0–40)
BASOPHILS # BLD AUTO: 0.1 X10E3/UL (ref 0–0.2)
BASOPHILS NFR BLD AUTO: 1 %
BILIRUB SERPL-MCNC: 0.5 MG/DL (ref 0–1.2)
BUN SERPL-MCNC: 20 MG/DL (ref 8–27)
BUN/CREAT SERPL: 21 (ref 12–28)
CALCIUM SERPL-MCNC: 9 MG/DL (ref 8.7–10.3)
CHLORIDE SERPL-SCNC: 104 MMOL/L (ref 96–106)
CHOLEST SERPL-MCNC: 149 MG/DL (ref 100–199)
CO2 SERPL-SCNC: 20 MMOL/L (ref 20–29)
CREAT SERPL-MCNC: 0.95 MG/DL (ref 0.57–1)
EGFRCR SERPLBLD CKD-EPI 2021: 66 ML/MIN/1.73
EOSINOPHIL # BLD AUTO: 0.1 X10E3/UL (ref 0–0.4)
EOSINOPHIL NFR BLD AUTO: 3 %
ERYTHROCYTE [DISTWIDTH] IN BLOOD BY AUTOMATED COUNT: 14.5 % (ref 11.7–15.4)
GLOBULIN SER CALC-MCNC: 2.3 G/DL (ref 1.5–4.5)
GLUCOSE SERPL-MCNC: 88 MG/DL (ref 65–99)
HCT VFR BLD AUTO: 39.6 % (ref 34–46.6)
HDLC SERPL-MCNC: 52 MG/DL
HGB BLD-MCNC: 12.7 G/DL (ref 11.1–15.9)
IMM GRANULOCYTES # BLD AUTO: 0 X10E3/UL (ref 0–0.1)
IMM GRANULOCYTES NFR BLD AUTO: 0 %
LDLC SERPL CALC-MCNC: 84 MG/DL (ref 0–99)
LDLC/HDLC SERPL: 1.6 RATIO (ref 0–3.2)
LYMPHOCYTES # BLD AUTO: 1 X10E3/UL (ref 0.7–3.1)
LYMPHOCYTES NFR BLD AUTO: 24 %
MCH RBC QN AUTO: 27.1 PG (ref 26.6–33)
MCHC RBC AUTO-ENTMCNC: 32.1 G/DL (ref 31.5–35.7)
MCV RBC AUTO: 85 FL (ref 79–97)
MONOCYTES # BLD AUTO: 0.4 X10E3/UL (ref 0.1–0.9)
MONOCYTES NFR BLD AUTO: 9 %
NEUTROPHILS # BLD AUTO: 2.7 X10E3/UL (ref 1.4–7)
NEUTROPHILS NFR BLD AUTO: 63 %
PLATELET # BLD AUTO: 233 X10E3/UL (ref 150–450)
POTASSIUM SERPL-SCNC: 4.5 MMOL/L (ref 3.5–5.2)
PROT SERPL-MCNC: 6.3 G/DL (ref 6–8.5)
RBC # BLD AUTO: 4.68 X10E6/UL (ref 3.77–5.28)
SODIUM SERPL-SCNC: 141 MMOL/L (ref 134–144)
TRIGL SERPL-MCNC: 67 MG/DL (ref 0–149)
TSH SERPL DL<=0.005 MIU/L-ACNC: 0.94 UIU/ML (ref 0.45–4.5)
VLDLC SERPL CALC-MCNC: 13 MG/DL (ref 5–40)
WBC # BLD AUTO: 4.3 X10E3/UL (ref 3.4–10.8)

## 2022-05-16 ENCOUNTER — OFFICE VISIT (OUTPATIENT)
Dept: FAMILY MEDICINE CLINIC | Facility: CLINIC | Age: 67
End: 2022-05-16

## 2022-05-16 VITALS
HEIGHT: 68 IN | OXYGEN SATURATION: 98 % | WEIGHT: 193 LBS | DIASTOLIC BLOOD PRESSURE: 77 MMHG | BODY MASS INDEX: 29.25 KG/M2 | TEMPERATURE: 97.8 F | SYSTOLIC BLOOD PRESSURE: 131 MMHG | HEART RATE: 88 BPM

## 2022-05-16 DIAGNOSIS — Z00.00 INITIAL MEDICARE ANNUAL WELLNESS VISIT: Primary | ICD-10-CM

## 2022-05-16 DIAGNOSIS — E78.00 PURE HYPERCHOLESTEROLEMIA: Chronic | ICD-10-CM

## 2022-05-16 DIAGNOSIS — E03.9 ADULT HYPOTHYROIDISM: ICD-10-CM

## 2022-05-16 PROCEDURE — 1170F FXNL STATUS ASSESSED: CPT | Performed by: NURSE PRACTITIONER

## 2022-05-16 PROCEDURE — G0438 PPPS, INITIAL VISIT: HCPCS | Performed by: NURSE PRACTITIONER

## 2022-05-16 PROCEDURE — 1160F RVW MEDS BY RX/DR IN RCRD: CPT | Performed by: NURSE PRACTITIONER

## 2022-05-16 RX ORDER — ROSUVASTATIN CALCIUM 10 MG/1
10 TABLET, COATED ORAL DAILY
Qty: 90 TABLET | Refills: 1 | Status: SHIPPED | OUTPATIENT
Start: 2022-05-16 | End: 2022-11-18 | Stop reason: SDUPTHER

## 2022-05-16 RX ORDER — LEVOTHYROXINE SODIUM 0.1 MG/1
100 TABLET ORAL DAILY
Qty: 90 TABLET | Refills: 1 | Status: SHIPPED | OUTPATIENT
Start: 2022-05-16 | End: 2022-11-18 | Stop reason: SDUPTHER

## 2022-05-16 NOTE — PROGRESS NOTES
The ABCs of the Annual Wellness Visit  Initial Medicare Wellness Visit    Chief Complaint   Patient presents with   • Medicare Wellness-Initial Visit     AWV Initial      Subjective   History of Present Illness:  Iris Kumar is a 66 y.o. female who presents for an Initial Medicare Wellness Visit.    The following portions of the patient's history were reviewed and   updated as appropriate: allergies, current medications, past family history, past medical history, past social history, past surgical history and problem list.     Compared to one year ago, the patient feels her physical   health is the same.    Compared to one year ago, the patient feels her mental   health is the same.    Recent Hospitalizations:  She was not admitted to the hospital during the last year.       Current Medical Providers:  Patient Care Team:  Jenni Styles APRN as PCP - General (Nurse Practitioner)    Outpatient Medications Prior to Visit   Medication Sig Dispense Refill   • calcium carbonate (OS-HECTOR) 600 MG tablet Take 750 mg by mouth Daily.     • Cholecalciferol (VITAMIN D-3) 1000 UNITS capsule Take by mouth.     • sodium bicarbonate 325 MG tablet Take by mouth.     • rosuvastatin (CRESTOR) 10 MG tablet Take 1 tablet by mouth Daily. 90 tablet 3   • Synthroid 100 MCG tablet TAKE 1 TABLET DAILY 90 tablet 0     No facility-administered medications prior to visit.       No opioid medication identified on active medication list. I have reviewed chart for other potential  high risk medication/s and harmful drug interactions in the elderly.          Aspirin is not on active medication list.  Aspirin use is not indicated based on review of current medical condition/s. Risk of harm outweighs potential benefits.  .    Patient Active Problem List   Diagnosis   • Adult hypothyroidism   • Post-menopausal   • Tubular adenoma of colon   • Sponge kidney   • Pure hypercholesterolemia     Advance Care Planning  Advance Directive is not on  [de-identified] : Discussed the results of the patient's history, physical exam, and imaging with patient and his wife. Mr. Kaye has been suffering from neck pain and stiffness/burning paresthesias intermittently for years, now more constant over the last 2 months. MRI/CT cervical shows C4/C5 and C5/C6 disc osteophyte and posterior element hypertrophy resulting in severe central canal stenosis and cord compression, moderate/severe bilateral foraminal stenosis; cord signal change at these levels consistent with myelomalacia. Explained to patient that this will require surgical decompression. The patient is a candidate for C4/C5 ACDF, C6 corpectomy, C5-C7 anterior fusion/cage, C4-C7 anterior plate.  Further non operative treatment would include no surgery.  Risks, benefits, alternatives were discussed with the patient at great length, including but not limited to, bleeding, infection, dysphagia, odynophagia, recurrent laryngeal injury, hypoglossal nerve injury, dural tear, worsening neurologic status, persistent neurologic deficit, pseudoarthrosis, hardware migration/failure, and the need for further surgery. Although strict hospital protocols are in place, discussed the perioperative risk of christina COVID-19 during hospital stay. Patient understands and accepts risks.  The patient asked a number of cogent questions.  All questions were answered.  The patient demonstrated understanding of the risks, benefits, and alternatives.  The patient has elected to proceed with surgery. He will need cardiology, pulmonology, and medical clearances.\par \par  "file.  ACP discussion was held with the patient during this visit. Patient has an advance directive (not in EMR), copy requested.    Review of Systems   Constitutional: Negative for appetite change and fatigue.   HENT: Negative for congestion and trouble swallowing.    Eyes: Negative for pain and visual disturbance.   Respiratory: Negative for cough and shortness of breath.    Cardiovascular: Negative for chest pain and palpitations.   Gastrointestinal: Negative for abdominal pain, blood in stool, constipation, diarrhea, nausea and vomiting.   Genitourinary: Negative for difficulty urinating and hematuria.   Musculoskeletal: Negative for arthralgias and myalgias.   Skin: Negative for rash.   Neurological: Negative for dizziness.   Psychiatric/Behavioral: Negative for dysphoric mood and sleep disturbance. The patient is not nervous/anxious.         Objective       Vitals:    05/16/22 1309 05/16/22 1351   BP: 145/86 131/77   Pulse: 88    Temp: 97.8 °F (36.6 °C)    SpO2: 98%    Weight: 87.5 kg (193 lb)    Height: 172.7 cm (68\")      BMI Readings from Last 1 Encounters:   05/16/22 29.35 kg/m²   BMI is above normal parameters. Recommendations include: exercise counseling and nutrition counseling    Does the patient have evidence of cognitive impairment? Yes    Physical Exam  Vitals and nursing note reviewed.   Constitutional:       General: She is not in acute distress.     Appearance: She is well-developed. She is not ill-appearing.   HENT:      Head: Normocephalic and atraumatic.      Right Ear: Tympanic membrane, ear canal and external ear normal.      Left Ear: Tympanic membrane, ear canal and external ear normal.      Mouth/Throat:      Pharynx: Uvula midline.   Eyes:      General: No scleral icterus.        Right eye: No discharge.         Left eye: No discharge.      Conjunctiva/sclera: Conjunctivae normal.      Pupils: Pupils are equal, round, and reactive to light.   Neck:      Thyroid: No thyromegaly. "   Cardiovascular:      Rate and Rhythm: Normal rate and regular rhythm.      Heart sounds: Normal heart sounds. No murmur heard.  Pulmonary:      Effort: Pulmonary effort is normal.      Breath sounds: Normal breath sounds.   Abdominal:      General: Bowel sounds are normal.      Palpations: Abdomen is soft.      Tenderness: There is no abdominal tenderness.   Musculoskeletal:         General: No deformity.      Cervical back: Neck supple.      Comments: Gait smooth and steady   Lymphadenopathy:      Cervical: No cervical adenopathy.   Skin:     General: Skin is warm and dry.   Neurological:      General: No focal deficit present.      Mental Status: She is alert and oriented to person, place, and time.   Psychiatric:         Attention and Perception: Attention and perception normal.         Mood and Affect: Mood and affect normal.         Speech: Speech normal.         Behavior: Behavior normal. Behavior is cooperative.         Thought Content: Thought content normal.         Cognition and Memory: Cognition normal.      Comments: Very pleasant, conversant, well-dressed, good medical historian       Lab Results   Component Value Date    CHLPL 149 05/13/2022    TRIG 67 05/13/2022    HDL 52 05/13/2022    LDL 84 05/13/2022    VLDL 13 05/13/2022          HEALTH RISK ASSESSMENT    Smoking Status:  Social History     Tobacco Use   Smoking Status Former Smoker   • Packs/day: 1.00   • Years: 10.00   • Pack years: 10.00   Smokeless Tobacco Never Used   Tobacco Comment    quit 1992     Alcohol Consumption:  Social History     Substance and Sexual Activity   Alcohol Use Yes     Fall Risk Screen:    STEADI Fall Risk Assessment was completed, and patient is at LOW risk for falls.Assessment completed on:5/16/2022    Depression Screen:   PHQ-2/PHQ-9 Depression Screening 5/16/2022   Little Interest or Pleasure in Doing Things 0-->not at all   Feeling Down, Depressed or Hopeless 0-->not at all   PHQ-9: Brief Depression Severity  Measure Score 0       Health Habits and Functional and Cognitive Screening:  Functional & Cognitive Status 5/16/2022   Do you have difficulty preparing food and eating? No   Do you have difficulty bathing yourself, getting dressed or grooming yourself? No   Do you have difficulty using the toilet? No   Do you have difficulty moving around from place to place? No   Do you have trouble with steps or getting out of a bed or a chair? No   Current Diet Well Balanced Diet   Dental Exam Up to date   Eye Exam Up to date   Exercise (times per week) 7 times per week   Current Exercises Include Walking;Light Weights   Do you need help using the phone?  No   Are you deaf or do you have serious difficulty hearing?  No   Do you need help with transportation? No   Do you need help shopping? No   Do you need help preparing meals?  No   Do you need help with housework?  No   Do you need help with laundry? No   Do you need help taking your medications? No   Do you need help managing money? No   Do you ever drive or ride in a car without wearing a seat belt? No   Have you felt unusual stress, anger or loneliness in the last month? No   Who do you live with? Alone   If you need help, do you have trouble finding someone available to you? No   Do you have difficulty concentrating, remembering or making decisions? No       Age-appropriate Screening Schedule:  Refer to the list below for future screening recommendations based on patient's age, sex and/or medical conditions. Orders for these recommended tests are listed in the plan section. The patient has been provided with a written plan.    Health Maintenance   Topic Date Due   • PAP SMEAR  06/25/2022 (Originally 4/6/2017)   • INFLUENZA VACCINE  08/01/2022   • LIPID PANEL  05/13/2023   • MAMMOGRAM  04/26/2024   • DXA SCAN  05/03/2024   • TDAP/TD VACCINES (2 - Td or Tdap) 04/06/2027   • ZOSTER VACCINE  Addressed            Assessment & Plan   CMS Preventative Services Quick  Reference  Risk Factors Identified During Encounter  Obesity/Overweight   The above risks/problems have been discussed with the patient.  Follow up actions/plans if indicated are seen below in the Assessment/Plan Section.  Pertinent information has been shared with the patient in the After Visit Summary.    Diagnoses and all orders for this visit:    1. Initial Medicare annual wellness visit (Primary)    2. Pure hypercholesterolemia  -     rosuvastatin (CRESTOR) 10 MG tablet; Take 1 tablet by mouth Daily.  Dispense: 90 tablet; Refill: 1    3. Adult hypothyroidism  -     levothyroxine (Synthroid) 100 MCG tablet; Take 1 tablet by mouth Daily.  Dispense: 90 tablet; Refill: 1     As part of wellness and prevention, the following topics were discussed with the patient:   Nutrition-diet high in fresh/fozen veges, lower in carbs, unsaturated fats, and higher in lean proteins, adequate hydration   Physical activity/regular exercise-150 mins per week of moderate activity that increases HR and is enjoyable to lower stress and improve CVD     Healthy weight-above goal BMI     Injury prevention-covid safety, back and joint health   Dental health-recommend twice per year dental cleans and daily flossing to lower inflammation in body   Mental health-stress mgmt and sleep hygiene   Immunization-recommended vaccines reviewed  Taking vitamin D and calcium-UTD dexascan  UTD CRC-5 yr recall  UTD mammo      Follow Up:  Return in about 6 months (around 11/16/2022) for Recheck.     An After Visit Summary and PPPS were made available to the patient.

## 2022-11-17 ENCOUNTER — OFFICE VISIT (OUTPATIENT)
Dept: FAMILY MEDICINE CLINIC | Facility: CLINIC | Age: 67
End: 2022-11-17

## 2022-11-17 VITALS
HEART RATE: 72 BPM | BODY MASS INDEX: 28.49 KG/M2 | HEIGHT: 68 IN | SYSTOLIC BLOOD PRESSURE: 112 MMHG | OXYGEN SATURATION: 99 % | WEIGHT: 188 LBS | DIASTOLIC BLOOD PRESSURE: 66 MMHG | TEMPERATURE: 97.5 F

## 2022-11-17 DIAGNOSIS — M85.80 OSTEOPENIA AFTER MENOPAUSE: Chronic | ICD-10-CM

## 2022-11-17 DIAGNOSIS — Z78.0 OSTEOPENIA AFTER MENOPAUSE: Chronic | ICD-10-CM

## 2022-11-17 DIAGNOSIS — E03.9 ADULT HYPOTHYROIDISM: Chronic | ICD-10-CM

## 2022-11-17 DIAGNOSIS — E78.00 PURE HYPERCHOLESTEROLEMIA: Primary | Chronic | ICD-10-CM

## 2022-11-17 PROCEDURE — 99214 OFFICE O/P EST MOD 30 MIN: CPT | Performed by: NURSE PRACTITIONER

## 2022-11-17 NOTE — PROGRESS NOTES
"Chief Complaint  Hyperlipidemia (F/u cholesterol ) and Hypothyroidism (F/u thyroid )    Subjective        Iris Kumar presents to [facility]  History of Present Illness    The patient presents today for a 6-month follow-up on thyroid and hypertension.    The patient reports that she is doing well overall. She states that her diet is fair and she can always improve in that area. She denies any muscle aches or pains from the cholesterol medication.    She notes that her thyroid is doing well. She denies any new fatigue, heart palpitations, chest pain, shortness of breath, or coughing. She reports that she does not tolerate the cold well. She states that she feels good in the morning.    She notes that her blood pressure was elevated today. She reports that she has a blood pressure cuff at home. She states that she has not checked her blood pressure in a long time.    She notes that she has had a hysterectomy. She reports that she does not have a uterus or cervix. She states that she has always been followed by her OB/GYN, but he is retired. She notes that she is due for a DEXA scan in 02/2023 or 03/2023 because she has osteopenia. She reports that she has already had her mammogram for this year. She states that her mammogram is normal. She notes that she is due for a colonoscopy next year.    She reports that she had her pneumonia vaccine in 2021. She states that she has had her shingles vaccine. She notes that she has had her influenza vaccine. She reports that she is waiting 2 weeks to do her other COVID-19 booster. She states that she will schedule it after Thanksgiving.      Objective   Vital Signs:  /66   Pulse 72   Temp 97.5 °F (36.4 °C)   Ht 172.7 cm (68\")   Wt 85.3 kg (188 lb)   SpO2 99%   BMI 28.59 kg/m²   Estimated body mass index is 28.59 kg/m² as calculated from the following:    Height as of this encounter: 172.7 cm (68\").    Weight as of this encounter: 85.3 " kg (188 lb).    BMI is >= 25 and <30. (Overweight) The following options were offered after discussion;: weight loss educational material (shared in after visit summary)      Physical Exam  Vitals and nursing note reviewed.   Constitutional:       General: She is not in acute distress.     Appearance: She is well-developed. She is not ill-appearing or diaphoretic.      Comments: Well dressed, well appearing   HENT:      Head: Normocephalic and atraumatic.      Mouth/Throat:      Mouth: Mucous membranes are moist.      Pharynx: No posterior oropharyngeal erythema.   Eyes:      General: No scleral icterus.        Right eye: No discharge.         Left eye: No discharge.      Conjunctiva/sclera: Conjunctivae normal.   Neck:      Thyroid: No thyromegaly.   Cardiovascular:      Rate and Rhythm: Normal rate and regular rhythm.      Heart sounds: Normal heart sounds.   Pulmonary:      Effort: Pulmonary effort is normal.      Breath sounds: Normal breath sounds.   Abdominal:      General: Bowel sounds are normal.      Palpations: Abdomen is soft.      Tenderness: There is no abdominal tenderness.   Musculoskeletal:         General: No deformity.      Cervical back: Neck supple.      Comments: Gait smooth and steady   Lymphadenopathy:      Cervical: No cervical adenopathy.   Skin:     General: Skin is warm and dry.   Neurological:      General: No focal deficit present.      Mental Status: She is alert and oriented to person, place, and time.   Psychiatric:         Mood and Affect: Mood normal.         Behavior: Behavior normal.         Thought Content: Thought content normal.      Comments: Very pleasant, conversant        Result Review :                Assessment and Plan   Diagnoses and all orders for this visit:    1. Pure hypercholesterolemia (Primary)  -     Lipid Panel With LDL / HDL Ratio    2. Adult hypothyroidism  -     TSH    3. Osteopenia after menopause  -     DEXA Bone Density Axial; Future        1. Thyroid  -  We will recheck her thyroid levels today. Has had some past fluctuations, but most recently has been stable.  Will adjust dose based on labs.     2. Hyperlipidemia  - We will recheck her lipid panel today. For now continue current crestor.      3. Hypertension  - We rechecked her blood pressure today and was excellent  - She will continue to monitor her blood pressure at home periodically    4. Osteopenia  - We will order a DEXA scan today.    5. Colon cancer screening  - She is due for a colonoscopy 2023.         Follow Up   Return in about 6 months (around 5/31/2023) for Medicare Wellness.  Patient was given instructions and counseling regarding her condition or for health maintenance advice. Please see specific information pulled into the AVS if appropriate.       Answers for HPI/ROS submitted by the patient on 11/17/2022  What is the primary reason for your visit?: Physical    Transcribed from ambient dictation for KAITLIN Araujo by Alyssa Burns.  11/17/22   11:49 EST    Patient or patient representative verbalized consent to the visit recording.  I have personally performed the services described in this document as transcribed by the above individual, and it is both accurate and complete.

## 2022-11-18 DIAGNOSIS — E78.00 PURE HYPERCHOLESTEROLEMIA: Chronic | ICD-10-CM

## 2022-11-18 DIAGNOSIS — E03.9 ADULT HYPOTHYROIDISM: ICD-10-CM

## 2022-11-18 LAB
CHOLEST SERPL-MCNC: 157 MG/DL (ref 0–200)
HDLC SERPL-MCNC: 63 MG/DL (ref 40–60)
LDLC SERPL CALC-MCNC: 79 MG/DL (ref 0–100)
LDLC/HDLC SERPL: 1.23 {RATIO}
TRIGL SERPL-MCNC: 81 MG/DL (ref 0–150)
TSH SERPL DL<=0.005 MIU/L-ACNC: 4.42 UIU/ML (ref 0.27–4.2)
VLDLC SERPL CALC-MCNC: 15 MG/DL (ref 5–40)

## 2022-11-18 RX ORDER — LEVOTHYROXINE SODIUM 0.1 MG/1
100 TABLET ORAL DAILY
Qty: 90 TABLET | Refills: 0 | Status: SHIPPED | OUTPATIENT
Start: 2022-11-18 | End: 2023-03-02 | Stop reason: SDUPTHER

## 2022-11-18 RX ORDER — ROSUVASTATIN CALCIUM 10 MG/1
10 TABLET, COATED ORAL DAILY
Qty: 90 TABLET | Refills: 1 | Status: SHIPPED | OUTPATIENT
Start: 2022-11-18

## 2023-03-02 DIAGNOSIS — E03.9 ADULT HYPOTHYROIDISM: ICD-10-CM

## 2023-03-02 RX ORDER — LEVOTHYROXINE SODIUM 0.1 MG/1
100 TABLET ORAL DAILY
Qty: 90 TABLET | Refills: 1 | Status: SHIPPED | OUTPATIENT
Start: 2023-03-02

## 2023-07-25 ENCOUNTER — TELEPHONE (OUTPATIENT)
Dept: FAMILY MEDICINE CLINIC | Facility: CLINIC | Age: 68
End: 2023-07-25
Payer: MEDICARE

## 2023-07-25 DIAGNOSIS — Z12.31 ENCOUNTER FOR SCREENING MAMMOGRAM FOR MALIGNANT NEOPLASM OF BREAST: Primary | ICD-10-CM

## 2023-07-25 NOTE — TELEPHONE ENCOUNTER
[facility] is calling to request an order for a mammo be faxed over. When checking pts chart no current or past mammo order from Jenni Styles. Once order is placed fax number is [phone]    Please advise

## 2023-08-10 ENCOUNTER — OFFICE VISIT (OUTPATIENT)
Dept: FAMILY MEDICINE CLINIC | Facility: CLINIC | Age: 68
End: 2023-08-10
Payer: MEDICARE

## 2023-08-10 VITALS
DIASTOLIC BLOOD PRESSURE: 94 MMHG | HEART RATE: 66 BPM | RESPIRATION RATE: 14 BRPM | HEART RATE: 74 BPM | TEMPERATURE: 97.5 F | HEART RATE: 90 BPM | TEMPERATURE: 98.1 F | OXYGEN SATURATION: 98 % | DIASTOLIC BLOOD PRESSURE: 66 MMHG | RESPIRATION RATE: 18 BRPM | SYSTOLIC BLOOD PRESSURE: 90 MMHG | TEMPERATURE: 97.3 F | DIASTOLIC BLOOD PRESSURE: 70 MMHG | HEART RATE: 68 BPM | HEART RATE: 89 BPM | DIASTOLIC BLOOD PRESSURE: 84 MMHG | DIASTOLIC BLOOD PRESSURE: 69 MMHG | DIASTOLIC BLOOD PRESSURE: 68 MMHG | SYSTOLIC BLOOD PRESSURE: 105 MMHG | SYSTOLIC BLOOD PRESSURE: 127 MMHG | RESPIRATION RATE: 21 BRPM | DIASTOLIC BLOOD PRESSURE: 75 MMHG | SYSTOLIC BLOOD PRESSURE: 148 MMHG | RESPIRATION RATE: 17 BRPM | RESPIRATION RATE: 19 BRPM | SYSTOLIC BLOOD PRESSURE: 117 MMHG | HEART RATE: 82 BPM | HEART RATE: 69 BPM | HEART RATE: 78 BPM | DIASTOLIC BLOOD PRESSURE: 87 MMHG | SYSTOLIC BLOOD PRESSURE: 120 MMHG | HEART RATE: 84 BPM | SYSTOLIC BLOOD PRESSURE: 113 MMHG | SYSTOLIC BLOOD PRESSURE: 159 MMHG | SYSTOLIC BLOOD PRESSURE: 114 MMHG | DIASTOLIC BLOOD PRESSURE: 72 MMHG | WEIGHT: 188 LBS | DIASTOLIC BLOOD PRESSURE: 73 MMHG | SYSTOLIC BLOOD PRESSURE: 130 MMHG | DIASTOLIC BLOOD PRESSURE: 58 MMHG | HEIGHT: 67 IN | OXYGEN SATURATION: 96 % | HEART RATE: 79 BPM | DIASTOLIC BLOOD PRESSURE: 78 MMHG | SYSTOLIC BLOOD PRESSURE: 110 MMHG | HEART RATE: 73 BPM | RESPIRATION RATE: 20 BRPM | OXYGEN SATURATION: 97 % | RESPIRATION RATE: 15 BRPM | SYSTOLIC BLOOD PRESSURE: 107 MMHG | SYSTOLIC BLOOD PRESSURE: 108 MMHG | SYSTOLIC BLOOD PRESSURE: 109 MMHG

## 2023-08-10 VITALS
SYSTOLIC BLOOD PRESSURE: 140 MMHG | TEMPERATURE: 97.5 F | WEIGHT: 189 LBS | DIASTOLIC BLOOD PRESSURE: 85 MMHG | HEIGHT: 68 IN | BODY MASS INDEX: 28.64 KG/M2 | RESPIRATION RATE: 14 BRPM | OXYGEN SATURATION: 97 % | HEART RATE: 79 BPM

## 2023-08-10 DIAGNOSIS — E78.00 PURE HYPERCHOLESTEROLEMIA: Chronic | ICD-10-CM

## 2023-08-10 DIAGNOSIS — E03.9 ADULT HYPOTHYROIDISM: ICD-10-CM

## 2023-08-10 DIAGNOSIS — M72.2 PLANTAR FASCIITIS, BILATERAL: ICD-10-CM

## 2023-08-10 DIAGNOSIS — Z00.00 MEDICARE ANNUAL WELLNESS VISIT, SUBSEQUENT: Primary | ICD-10-CM

## 2023-08-10 DIAGNOSIS — Q61.5 SPONGE KIDNEY: ICD-10-CM

## 2023-08-10 PROCEDURE — 1159F MED LIST DOCD IN RCRD: CPT | Performed by: NURSE PRACTITIONER

## 2023-08-10 PROCEDURE — 1160F RVW MEDS BY RX/DR IN RCRD: CPT | Performed by: NURSE PRACTITIONER

## 2023-08-10 PROCEDURE — G0439 PPPS, SUBSEQ VISIT: HCPCS | Performed by: NURSE PRACTITIONER

## 2023-08-10 RX ORDER — ROSUVASTATIN CALCIUM 10 MG/1
10 TABLET, COATED ORAL DAILY
Qty: 90 TABLET | Refills: 1 | Status: SHIPPED | OUTPATIENT
Start: 2023-08-10 | End: 2023-08-24 | Stop reason: SDUPTHER

## 2023-08-10 NOTE — PROGRESS NOTES
The ABCs of the Annual Wellness Visit  Subsequent Medicare Wellness Visit    Subjective    Iris Kumar is a 67 y.o. female who presents for a Subsequent Medicare Wellness Visit.    The following portions of the patient's history were reviewed and   updated as appropriate: allergies, current medications, past family history, past medical history, past social history, past surgical history, and problem list.    Compared to one year ago, the patient feels her physical   health is the same.    Compared to one year ago, the patient feels her mental   health is the same.    Recent Hospitalizations:  She was not admitted to the hospital during the last year.       Current Medical Providers:  Patient Care Team:  Jenni Styles APRN as PCP - General (Nurse Practitioner)    Outpatient Medications Prior to Visit   Medication Sig Dispense Refill    calcium carbonate (OS-HECTOR) 600 MG tablet Take 500 mg by mouth 2 (Two) Times a Day With Meals.      Cholecalciferol (VITAMIN D-3) 1000 UNITS capsule Take 2,000 Units by mouth Daily With Dinner. Pt takes 2000units daily and 5000units on Sunday      sodium bicarbonate 325 MG tablet Take 2 tablets by mouth 2 (Two) Times a Day.      levothyroxine (Synthroid) 100 MCG tablet Take 1 tablet by mouth Daily. 90 tablet 1    rosuvastatin (CRESTOR) 10 MG tablet Take 1 tablet by mouth Daily. 90 tablet 1     No facility-administered medications prior to visit.       No opioid medication identified on active medication list. I have reviewed chart for other potential  high risk medication/s and harmful drug interactions in the elderly.        Aspirin is not on active medication list.  Aspirin use is not indicated based on review of current medical condition/s. Risk of harm outweighs potential benefits.  .    Patient Active Problem List   Diagnosis    Adult hypothyroidism    Post-menopausal    Tubular adenoma of colon    Sponge kidney    Pure hypercholesterolemia     Advance Care Planning  "  Advance Care Planning     Advance Directive is not on file.  ACP discussion was held with the patient during this visit. Patient has an advance directive (not in EMR), copy requested.     Objective    Vitals:    08/10/23 1323   BP: 140/85   Pulse: 79   Resp: 14   Temp: 97.5 øF (36.4 øC)   TempSrc: Temporal   SpO2: 97%   Weight: 85.7 kg (189 lb)   Height: 172.7 cm (68\")   PainSc: 0-No pain     Estimated body mass index is 28.74 kg/mý as calculated from the following:    Height as of this encounter: 172.7 cm (68\").    Weight as of this encounter: 85.7 kg (189 lb).    BMI is >= 25 and <30. (Overweight) The following options were offered after discussion;: exercise counseling/recommendations and nutrition counseling/recommendations      Does the patient have evidence of cognitive impairment? No    Lab Results   Component Value Date    TRIG 76 2023    HDL 53 2023    LDL 93 2023    VLDL 15 2023        HEALTH RISK ASSESSMENT    Smoking Status:  Social History     Tobacco Use   Smoking Status Former    Packs/day: 1.00    Years: 10.00    Pack years: 10.00    Types: Cigarettes   Smokeless Tobacco Never   Tobacco Comments    quit      Alcohol Consumption:  Social History     Substance and Sexual Activity   Alcohol Use Not Currently     Fall Risk Screen:    LAUREN Fall Risk Assessment was completed, and patient is at LOW risk for falls.Assessment completed on:8/10/2023    Depression Screenin/10/2023     1:32 PM   PHQ-2/PHQ-9 Depression Screening   Little Interest or Pleasure in Doing Things 0-->not at all   Feeling Down, Depressed or Hopeless 0-->not at all   PHQ-9: Brief Depression Severity Measure Score 0       Health Habits and Functional and Cognitive Screenin/10/2023     1:30 PM   Functional & Cognitive Status   Do you have difficulty preparing food and eating? No   Do you have difficulty bathing yourself, getting dressed or grooming yourself? No   Do you have difficulty " using the toilet? No   Do you have difficulty moving around from place to place? No   Do you have trouble with steps or getting out of a bed or a chair? No   Current Diet Other   Dental Exam Up to date   Eye Exam Not up to date   Exercise (times per week) 5 times per week   Current Exercises Include Walking   Do you need help using the phone?  No   Are you deaf or do you have serious difficulty hearing?  No   Do you need help to go to places out of walking distance? No   Do you need help shopping? No   Do you need help preparing meals?  No   Do you need help with housework?  No   Do you need help with laundry? No   Do you need help managing money? No   Do you ever drive or ride in a car without wearing a seat belt? No   Have you felt unusual stress, anger or loneliness in the last month? No   Who do you live with? Alone   If you need help, do you have trouble finding someone available to you? No   Have you been bothered in the last four weeks by sexual problems? No   Do you have difficulty concentrating, remembering or making decisions? No       Age-appropriate Screening Schedule:  Refer to the list below for future screening recommendations based on patient's age, sex and/or medical conditions. Orders for these recommended tests are listed in the plan section. The patient has been provided with a written plan.    Health Maintenance   Topic Date Due    Pneumococcal Vaccine 65+ (2 - PCV) 02/26/2022    COVID-19 Vaccine (5 - Pfizer series) 04/20/2023    INFLUENZA VACCINE  10/01/2023    DXA SCAN  05/03/2024    ANNUAL WELLNESS VISIT  08/10/2024    LIPID PANEL  08/23/2024    MAMMOGRAM  07/26/2025    TDAP/TD VACCINES (2 - Td or Tdap) 04/06/2027    COLORECTAL CANCER SCREENING  08/16/2028    HEPATITIS C SCREENING  Completed    ZOSTER VACCINE  Addressed    PAP SMEAR  Discontinued                  CMS Preventative Services Quick Reference  Risk Factors Identified During Encounter  None Identified  The above risks/problems have  "been discussed with the patient.  Pertinent information has been shared with the patient in the After Visit Summary.  An After Visit Summary and PPPS were made available to the patient.    Follow Up:   Next Medicare Wellness visit to be scheduled in 1 year.       Additional E&M Note during same encounter follows:  Patient has multiple medical problems which are significant and separately identifiable that require additional work above and beyond the Medicare Wellness Visit.      Chief Complaint  Medicare Wellness-subsequent    Subjective        HPI  Iris Kumar is also being seen today for AWV and complaints of recurrent plantar fasciitis as well as HLD, hypothyroid.     Review of Systems   Constitutional: Negative.    HENT: Negative.     Eyes: Negative.    Respiratory: Negative.     Cardiovascular: Negative.    Gastrointestinal: Negative.    Endocrine: Negative.    Genitourinary: Negative.    Musculoskeletal:         Bilateral plantar fascitis   Allergic/Immunologic: Negative.    Neurological: Negative.    Hematological: Negative.    Psychiatric/Behavioral: Negative.       Objective   Vital Signs:  /85   Pulse 79   Temp 97.5 øF (36.4 øC) (Temporal)   Resp 14   Ht 172.7 cm (68\")   Wt 85.7 kg (189 lb)   SpO2 97%   BMI 28.74 kg/mý     Physical Exam  Vitals reviewed.   Constitutional:       Appearance: Normal appearance.   HENT:      Head: Normocephalic.      Right Ear: Tympanic membrane, ear canal and external ear normal.      Left Ear: Tympanic membrane, ear canal and external ear normal.      Nose: Nose normal.      Mouth/Throat:      Mouth: Mucous membranes are moist.      Pharynx: Oropharynx is clear.   Eyes:      Conjunctiva/sclera: Conjunctivae normal.      Pupils: Pupils are equal, round, and reactive to light.   Cardiovascular:      Rate and Rhythm: Normal rate and regular rhythm.      Pulses: Normal pulses.      Heart sounds: Normal heart sounds.   Pulmonary:      Effort: Pulmonary effort is " normal.      Breath sounds: Normal breath sounds.   Abdominal:      General: Abdomen is flat. Bowel sounds are normal.      Palpations: Abdomen is soft.   Musculoskeletal:      Cervical back: Normal range of motion.      Comments: Bilateral TTP plantar hind foot   Skin:     General: Skin is warm and dry.      Capillary Refill: Capillary refill takes less than 2 seconds.   Neurological:      General: No focal deficit present.      Mental Status: She is alert and oriented to person, place, and time.   Psychiatric:         Mood and Affect: Mood normal.         Behavior: Behavior normal.                       Assessment and Plan   Diagnoses and all orders for this visit:    1. Medicare annual wellness visit, subsequent (Primary)    2. Adult hypothyroidism  -     TSH; Future    3. Pure hypercholesterolemia  -     Discontinue: rosuvastatin (CRESTOR) 10 MG tablet; Take 1 tablet by mouth Daily.  Dispense: 90 tablet; Refill: 1  -     Comprehensive metabolic panel; Future  -     Lipid panel; Future    4. Plantar fasciitis, bilateral    5. Sponge kidney  -     CBC w AUTO Differential; Future      Appropriate health maintenance and prevention topics specific for this patient were discussed today.  Additionally, health goals, and health concerns addressed as appropriate.  Pt was encouraged to stay up to date on recommended screenings and vaccines based on USPSTF guidelines.        Pt to have repeat labs done today. Will notify with results. F/U in 6 months.    Pt does not want referral for plantar fascitis. She is resting and treating at home: exercises, shoe insoles, and ice. When feet feel better will rest another week prior to returning to normal activity. Will contact the office if it gets worse. Or not resolving.         Follow Up   No follow-ups on file.  Patient was given instructions and counseling regarding her condition or for health maintenance advice. Please see specific information pulled into the AVS if  appropriate.     Student present and participated in some aspects of exam/HPI with pt consent.  Pt was seen and examined by me.  DX, MGMT all discussed personally with patient. Documentation done by myself with student.

## 2023-08-10 NOTE — PROGRESS NOTES
"Chief Complaint  Medicare Wellness-subsequent    Subjective        Iris Kumar presents to [facility]  History of Present Illness    Objective   Vital Signs:  /85   Pulse 79   Temp 97.5 øF (36.4 øC) (Temporal)   Resp 14   Ht 172.7 cm (68\")   Wt 85.7 kg (189 lb)   SpO2 97%   BMI 28.74 kg/mý   Estimated body mass index is 28.74 kg/mý as calculated from the following:    Height as of this encounter: 172.7 cm (68\").    Weight as of this encounter: 85.7 kg (189 lb).             Physical Exam   Result Review :                   Assessment and Plan   There are no diagnoses linked to this encounter.         Follow Up   No follow-ups on file.  Patient was given instructions and counseling regarding her condition or for health maintenance advice. Please see specific information pulled into the AVS if appropriate.         "

## 2023-08-16 ENCOUNTER — OFFICE (AMBULATORY)
Dept: URBAN - METROPOLITAN AREA PATHOLOGY 4 | Facility: PATHOLOGY | Age: 68
End: 2023-08-16
Payer: COMMERCIAL

## 2023-08-16 ENCOUNTER — AMBULATORY SURGICAL CENTER (AMBULATORY)
Dept: URBAN - METROPOLITAN AREA SURGERY 17 | Facility: SURGERY | Age: 68
End: 2023-08-16
Payer: COMMERCIAL

## 2023-08-16 DIAGNOSIS — D12.4 BENIGN NEOPLASM OF DESCENDING COLON: ICD-10-CM

## 2023-08-16 DIAGNOSIS — D12.2 BENIGN NEOPLASM OF ASCENDING COLON: ICD-10-CM

## 2023-08-16 DIAGNOSIS — D12.3 BENIGN NEOPLASM OF TRANSVERSE COLON: ICD-10-CM

## 2023-08-16 DIAGNOSIS — Z86.010 PERSONAL HISTORY OF COLONIC POLYPS: ICD-10-CM

## 2023-08-16 DIAGNOSIS — D12.8 BENIGN NEOPLASM OF RECTUM: ICD-10-CM

## 2023-08-16 DIAGNOSIS — K57.30 DIVERTICULOSIS OF LARGE INTESTINE WITHOUT PERFORATION OR ABS: ICD-10-CM

## 2023-08-16 PROBLEM — K63.5 POLYP OF COLON: Status: ACTIVE | Noted: 2023-08-16

## 2023-08-16 PROBLEM — K62.1 RECTAL POLYP: Status: ACTIVE | Noted: 2023-08-16

## 2023-08-16 LAB
GI HISTOLOGY: A. UNSPECIFIED: (no result)
GI HISTOLOGY: B. UNSPECIFIED: (no result)
GI HISTOLOGY: C. UNSPECIFIED: (no result)
GI HISTOLOGY: D. UNSPECIFIED: (no result)
GI HISTOLOGY: PDF REPORT: (no result)

## 2023-08-16 PROCEDURE — 45385 COLONOSCOPY W/LESION REMOVAL: CPT | Mod: PT | Performed by: INTERNAL MEDICINE

## 2023-08-16 PROCEDURE — 88305 TISSUE EXAM BY PATHOLOGIST: CPT | Performed by: INTERNAL MEDICINE

## 2023-08-16 RX ADMIN — ONDANSETRON HYDROCHLORIDE 4 MG: 4 SOLUTION ORAL at 08:52

## 2023-08-16 NOTE — SERVICEHPINOTES
ЕКАТЕРИНА BRIAN  is a  67  female   who presents today for a  Colonoscopy   for   the indications listed below. The updated Patient Profile was reviewed prior to the procedure, in conjunction with the Physical Exam, including medical conditions, surgical procedures, medications, allergies, family history and social history. See Physical Exam time stamp below for date and time of HPI completion.Pre-operatively, I reviewed the indication(s) for the procedure, the risks of the procedure [including but not limited to: unexpected bleeding possibly requiring hospitalization and/or unplanned repeat procedures, perforation possibly requiring surgical treatment, missed lesions and complications of sedation/MAC (also explained by anesthesia staff)]. I have evaluated the patient for risks associated with the planned anesthesia and the procedure to be performed and find the patient an acceptable candidate for IV sedation.Multiple opportunities were provided for any questions or concerns, and all questions were answered satisfactorily before any anesthesia was administered. We will proceed with the planned procedure.br

## 2023-08-23 ENCOUNTER — LAB (OUTPATIENT)
Dept: LAB | Facility: HOSPITAL | Age: 68
End: 2023-08-23
Payer: MEDICARE

## 2023-08-23 PROCEDURE — 80061 LIPID PANEL: CPT | Performed by: NURSE PRACTITIONER

## 2023-08-23 PROCEDURE — 85025 COMPLETE CBC W/AUTO DIFF WBC: CPT | Performed by: NURSE PRACTITIONER

## 2023-08-23 PROCEDURE — 80053 COMPREHEN METABOLIC PANEL: CPT | Performed by: NURSE PRACTITIONER

## 2023-08-23 PROCEDURE — 84443 ASSAY THYROID STIM HORMONE: CPT | Performed by: NURSE PRACTITIONER

## 2023-08-24 DIAGNOSIS — E78.00 PURE HYPERCHOLESTEROLEMIA: Chronic | ICD-10-CM

## 2023-08-24 DIAGNOSIS — E03.9 ADULT HYPOTHYROIDISM: ICD-10-CM

## 2023-08-24 RX ORDER — ROSUVASTATIN CALCIUM 10 MG/1
10 TABLET, COATED ORAL DAILY
Qty: 90 TABLET | Refills: 1 | Status: SHIPPED | OUTPATIENT
Start: 2023-08-24

## 2023-08-24 RX ORDER — LEVOTHYROXINE SODIUM 0.1 MG/1
100 TABLET ORAL DAILY
Qty: 90 TABLET | Refills: 1 | Status: SHIPPED | OUTPATIENT
Start: 2023-08-24

## 2023-09-08 RX ORDER — AMLODIPINE BESYLATE 2.5 MG/1
2.5 TABLET ORAL DAILY
Qty: 30 TABLET | Refills: 1 | Status: SHIPPED | OUTPATIENT
Start: 2023-09-08

## 2023-09-29 RX ORDER — AMLODIPINE BESYLATE 5 MG/1
5 TABLET ORAL DAILY
Qty: 30 TABLET | Refills: 0 | Status: SHIPPED | OUTPATIENT
Start: 2023-09-29

## 2023-10-16 RX ORDER — AMLODIPINE BESYLATE 5 MG/1
5 TABLET ORAL DAILY
Qty: 90 TABLET | Refills: 1 | Status: SHIPPED | OUTPATIENT
Start: 2023-10-16

## 2024-02-12 ENCOUNTER — OFFICE VISIT (OUTPATIENT)
Dept: FAMILY MEDICINE CLINIC | Facility: CLINIC | Age: 69
End: 2024-02-12
Payer: MEDICARE

## 2024-02-12 VITALS
OXYGEN SATURATION: 97 % | HEIGHT: 68 IN | BODY MASS INDEX: 28.28 KG/M2 | HEART RATE: 73 BPM | SYSTOLIC BLOOD PRESSURE: 128 MMHG | TEMPERATURE: 97 F | RESPIRATION RATE: 14 BRPM | WEIGHT: 186.6 LBS | DIASTOLIC BLOOD PRESSURE: 80 MMHG

## 2024-02-12 DIAGNOSIS — M85.80 OSTEOPENIA AFTER MENOPAUSE: ICD-10-CM

## 2024-02-12 DIAGNOSIS — E78.00 PURE HYPERCHOLESTEROLEMIA: Chronic | ICD-10-CM

## 2024-02-12 DIAGNOSIS — M72.2 PLANTAR FASCIITIS, BILATERAL: Chronic | ICD-10-CM

## 2024-02-12 DIAGNOSIS — M79.671 FOOT PAIN, BILATERAL: Chronic | ICD-10-CM

## 2024-02-12 DIAGNOSIS — I10 PRIMARY HYPERTENSION: Primary | Chronic | ICD-10-CM

## 2024-02-12 DIAGNOSIS — E03.9 ADULT HYPOTHYROIDISM: Chronic | ICD-10-CM

## 2024-02-12 DIAGNOSIS — M79.672 FOOT PAIN, BILATERAL: Chronic | ICD-10-CM

## 2024-02-12 DIAGNOSIS — Z78.0 OSTEOPENIA AFTER MENOPAUSE: ICD-10-CM

## 2024-02-12 PROCEDURE — 1159F MED LIST DOCD IN RCRD: CPT | Performed by: NURSE PRACTITIONER

## 2024-02-12 PROCEDURE — 90677 PCV20 VACCINE IM: CPT | Performed by: NURSE PRACTITIONER

## 2024-02-12 PROCEDURE — 1160F RVW MEDS BY RX/DR IN RCRD: CPT | Performed by: NURSE PRACTITIONER

## 2024-02-12 PROCEDURE — 99214 OFFICE O/P EST MOD 30 MIN: CPT | Performed by: NURSE PRACTITIONER

## 2024-02-12 PROCEDURE — G0009 ADMIN PNEUMOCOCCAL VACCINE: HCPCS | Performed by: NURSE PRACTITIONER

## 2024-02-21 ENCOUNTER — HOSPITAL ENCOUNTER (OUTPATIENT)
Dept: PHYSICAL THERAPY | Facility: HOSPITAL | Age: 69
Discharge: HOME | End: 2024-02-21
Admitting: NURSE PRACTITIONER
Payer: MEDICARE

## 2024-02-21 DIAGNOSIS — M79.671 BILATERAL FOOT PAIN: Primary | ICD-10-CM

## 2024-02-21 DIAGNOSIS — M79.672 BILATERAL FOOT PAIN: Primary | ICD-10-CM

## 2024-02-21 DIAGNOSIS — R26.2 DIFFICULTY WALKING: ICD-10-CM

## 2024-02-21 DIAGNOSIS — E03.9 ADULT HYPOTHYROIDISM: ICD-10-CM

## 2024-02-21 PROCEDURE — 97110 THERAPEUTIC EXERCISES: CPT

## 2024-02-21 PROCEDURE — 97161 PT EVAL LOW COMPLEX 20 MIN: CPT

## 2024-02-21 NOTE — THERAPY EVALUATION
Outpatient Physical Therapy Ortho Initial Evaluation  [facility]     Patient Name: Iris Kumar  : 1955  MRN: [id number]  Today's Date: 2024      Visit Date: 2024    Patient Active Problem List   Diagnosis    Adult hypothyroidism    Post-menopausal    Tubular adenoma of colon    Sponge kidney    Pure hypercholesterolemia        Past Medical History:   Diagnosis Date    Allergic 1990    Reglan    Cancer cis colon 2005    Cataract surgery     Colon polyp     cancerours    Diverticulosis detected on colonoscopy 3 yrs ago    Heart murmur     mitral valve prolapse    Hypertension     Hypothyroidism     Menopause     Pure hypercholesterolemia 2021        Past Surgical History:   Procedure Laterality Date    EYE SURGERY      cataract    HYSTERECTOMY      TOTAL ABDOMINAL HYSTERECTOMY WITH SALPINGO OOPHORECTOMY         Visit Dx:     ICD-10-CM ICD-9-CM   1. Bilateral foot pain  M79.671 729.5    M79.672    2. Difficulty walking  R26.2 719.7          Patient History       Row Name 24 0900             History    Chief Complaint Burn;Tightness  -JS      Brief Description of Current Complaint Iris Kumar is a 68 y.o. female who presents to physical therapy today with primary compliant of B foot/ankle pain. She reports her pain initially began when she developed R plantar fascitis and started to have gait changes/compensation leading to L ankle stiffness. She utilized SPC for approximately 3 months before symptoms began to improve. Her symptoms have began to improve approximately 6 weeks ago. She reports ongoing L heel discomfort and stiffness. She is aware she compensates with ambulation but no longer using SPC. Iris Kumar reports difficulty/increased pain with long distance ambulation, standing for longer periods of time and walking on uneven ground. Patient reports being very active prior to onset of pain and she has since returned to walking program. Pain  relieving factors include NSAIDs, resting, and ankle pumps. PMH includes HTN. Iris Silvas primary goal for attending skilled physical therapy is to return to walking program for 30 minutes and return to her step count of 10k steps per day.  -JS      Patient/Caregiver Goals Relieve pain;Improve mobility;Improve strength;Know what to do to help the symptoms;Return to prior level of function  -JS      Occupation/sports/leisure activities retired and likes to be active  -JS      Are you or can you be pregnant No  -JS         Pain     Pain Location Ankle;Foot  -JS      Pain at Present 0  -JS      Pain at Best 0  -JS      Pain at Worst 3  -JS      Pain Frequency Intermittent  -JS      Is your sleep disturbed? No  -JS         Fall Risk Assessment    Any falls in the past year: No  -JS         Services    Prior Rehab/Home Health Experiences No  -JS         Daily Activities    Primary Language English  -JS      How does patient learn best? Reading;Demonstration;Pictures/Video  -JS      Barriers to learning None  -JS      Pt Participated in POC and Goals Yes  -JS         Safety    Are you being hurt, hit, or frightened by anyone at home or in your life? No  -JS      Are you being neglected by a caregiver No  -JS      Have you had any of the following issues with N/A  -JS                User Key  (r) = Recorded By, (t) = Taken By, (c) = Cosigned By      Initials Name Provider Type    Adrianne Penny, PT Physical Therapist                     PT Ortho       Row Name 02/21/24 0900       Subjective    Subjective Comments eval  -JS       Special Tests/Palpation    Special Tests/Palpation Ankle/Foot  -JS       Foot/Ankle Palpation    Foot/Ankle Palpation? Yes  -JS    Plantar Fascia Bilateral:;Tender  -JS    Medial Gastroc Bilateral:;Tender;Guarded/taut  -JS    Lateral Gastroc Bilateral:;Tender;Guarded/taut  -JS       Ankle Accessory Motions    Ankle Accessory Motions Tested? Yes  -JS    Distal tib/fib A-P glide  Right:;Left:;Hypomobile  -JS       General ROM    RT Lower Ext Rt Ankle Dorsiflexion;Rt Ankle Plantarflexion;Rt Ankle Inversion;Rt Ankle Eversion  -JS    LT Lower Ext Lt Ankle Dorsiflexion;Lt Ankle Plantarflexion;Lt Ankle Inversion;Lt Ankle Eversion  -JS       Right Lower Ext    Rt Ankle Dorsiflexion AROM 0  -JS    Rt Ankle Plantarflexion AROM 60  -JS    Rt Ankle Inversion AROM 20  -JS    Rt Ankle Eversion AROM 23  -JS       Left Lower Ext    Lt Ankle Dorsiflexion AROM 0  -JS    Lt Ankle Plantarflexion AROM 55  -JS    Lt Ankle Inversion AROM 20  -JS    Lt Ankle Eversion AROM 26  -JS       MMT (Manual Muscle Testing)    Rt Lower Ext Rt Hip Flexion;Rt Knee Extension;Rt Knee Flexion;Rt Ankle Plantarflexion;Rt Ankle Dorsiflexion;Rt Ankle Subtalar Inversion;Rt Ankle Subtalar Eversion  -JS    Lt Lower Ext Lt Hip Flexion;Lt Knee Extension;Lt Knee Flexion;Lt Ankle Plantarflexion;Lt Ankle Dorsiflexion;Lt Ankle Subtalar Inversion;Lt Ankle Subtalar Eversion  -JS       MMT Right Lower Ext    Rt Hip Flexion MMT, Gross Movement (4/5) good  -JS    Rt Knee Extension MMT, Gross Movement (4+/5) good plus  -JS    Rt Knee Flexion MMT, Gross Movement (4+/5) good plus  -JS    Rt Ankle Dorsiflexion MMT, Gross Movement (5/5) normal  -JS    Rt Ankle Subtalar Inversion MT, Gross Movement (4/5) good  -JS    Rt Ankle Subtalar Eversion MMT, Gross Movement (4+/5) good plus  -JS       MMT Left Lower Ext    Lt Hip Flexion MMT, Gross Movement (4/5) good  -JS    Lt Knee Extension MMT, Gross Movement (4+/5) good plus  -JS    Lt Knee Flexion MMT, Gross Movement (4+/5) good plus  -JS    Lt Ankle Dorsiflexion MMT, Gross Movement (5/5) normal  -JS    Lt Ankle Subtalar Inversion MMT, Gross Movement (4/5) good  -JS    Lt Ankle Subtalar Eversion MMT, Gross Movement (4/5) good  -JS       Sensation    Sensation WNL? WNL  -JS       Flexibility    Flexibility Tested? Lower Extremity  -JS       Lower Extremity Flexibility    Hamstrings Bilateral:;Mildly  limited;Moderately limited  -JS    Gastrocnemius Right:;Mildly limited;Left:;Moderately limited  -JS       Gait/Stairs (Locomotion)    Comment, (Gait/Stairs) B foot pronation, inc toe sign bilaterally, slow pace, limited arm swing  -JS              User Key  (r) = Recorded By, (t) = Taken By, (c) = Cosigned By      Initials Name Provider Type    Adrianne Penny, PT Physical Therapist                                Therapy Education  Education Details: Educated on anatomy/pathology, evaluation findings, therapy expectations, relationship between gait and pain, POC and HEP Access Code [email or web address]  Given: HEP, Symptoms/condition management, Pain management, Posture/body mechanics  Program: New  How Provided: Verbal, Demonstration, Written  Provided to: Patient  Level of Understanding: Verbalized, Demonstrated  75414 - PT Self Care/Mgmt Minutes: 5      PT OP Goals       Row Name 02/21/24 0900          PT Short Term Goals    STG 1 Pt will be independent and verbalized good understanding of initial HEP to improve ability to manage pain, as well as improve ankle strength and mobility.  -JS     STG 1 Progress New  -JS     STG 2 Patient will report 50% improvement in ambulation tolerance while participating in walking program to demo improved activity tolerance and return to PLOF.  -JS     STG 2 Progress New  -JS        Long Term Goals    LTG 1 Pt will be independent and verbalized good understanding of advance HEP to improve ability to manage pain, as well as improve ankle strength and mobility.  -JS     LTG 1 Progress New  -JS     LTG 2 The pt will demonstrate B ankle DF AROM to at least 5 deg to facilitate improved gait pattern.  -JS     LTG 2 Progress New  -JS     LTG 3 The pt will demonstrate B ankle strength to at least 4+/5 for improved functional strength and balance.  -JS     LTG 3 Progress New  -JS     LTG 4 Patient will report 75% improvement in ambulation tolerance while participating in walking program  to demo improved activity tolerance and return to PLOF.  -JS     LTG 4 Progress New  -JS     LTG 5 The pt will score greater than or equal to 93% ability on the FAAM to indicate improved perceived performance of ADLs.  -JS     LTG 5 Progress New  -JS               User Key  (r) = Recorded By, (t) = Taken By, (c) = Cosigned By      Initials Name Provider Type    Adrianne Penny, PT Physical Therapist                     PT Assessment/Plan       Row Name 02/21/24 0900          PT Assessment    Functional Limitations Impaired gait;Limitations in community activities;Limitations in functional capacity and performance;Performance in leisure activities  -     Impairments Balance;Gait;Impaired flexibility;Joint mobility;Impaired muscle length;Muscle strength;Pain;Range of motion  -     Assessment Comments Iris Kumar is a 68 y.o. female referred to physical therapy for B foot pain. Her pain initially began when she developed R plantar fascitis and started to have gait changes/compensation leading to L ankle stiffness. She presents with a stable clinical presentation, along with comorbidities of HTN and no remarkable personal factors that may impact her progress in the plan of care. Pt presents today with impaired gait, inc B foot arches, B ankle weakness, dec B DF AROM, and TTP over L calcaneous. Her signs and symptoms are consistent with referring diagnosis. The previous impairments limit her ability to participate in walking program without limitation and average her previous step count of 10k steps/day. Patients FAAM outcome measure score of 83% ability, where 100% represents no disability. Pt will benefit from skilled PT to address the previous impairments and return to PLOF.  -JS     Please refer to paper survey for additional self-reported information Yes  -JS     Rehab Potential Good  -JS     Patient/caregiver participated in establishment of treatment plan and goals Yes  -JS     Patient would  benefit from skilled therapy intervention Yes  -JS        PT Plan    PT Frequency 1x/week;2x/week  -JS     Predicted Duration of Therapy Intervention (PT) 6-8 visits  -JS     Planned CPT's? PT EVAL LOW COMPLEXITY: 37696;PT RE-EVAL: 11365;PT THER PROC EA 15 MIN: 10834;PT THER ACT EA 15 MIN: 75990;PT MANUAL THERAPY EA 15 MIN: 65356;PT NEUROMUSC RE-EDUCATION EA 15 MIN: 89350;PT GAIT TRAINING EA 15 MIN: 59588;PT SELF CARE/HOME MGMT/TRAIN EA 15: 71754;PT HOT OR COLD PACK TREAT MCARE;PT ELECTRICAL STIM UNATTEND:   -JS     PT Plan Comments response to eval, warm upon bike/nustep, consider standing gastroc stretch, B ankle rocks, seated heel slides, small range lunge onto foam, rocker board, standing HR/TR, tandem walking, lateral stepping  -JS               User Key  (r) = Recorded By, (t) = Taken By, (c) = Cosigned By      Initials Name Provider Type    Adrianne Penny, PT Physical Therapist                       OP Exercises       Row Name 02/21/24 0900             Subjective    Subjective Comments eval  -JS         Total Minutes    47454 - PT Therapeutic Exercise Minutes 10  -JS         Exercise 1    Exercise Name 1 nustep/bike  -JS      Additional Comments next visit  -JS         Exercise 2    Exercise Name 2 ankle pumps  -JS      Reps 2 10  -JS         Exercise 3    Exercise Name 3 long sitting gastroc stretch  -JS      Reps 3 3  -JS      Time 3 20s  -JS         Exercise 4    Exercise Name 4 ankle 4 way  -JS      Reps 4 15e  -JS      Time 4 GTB  -JS                User Key  (r) = Recorded By, (t) = Taken By, (c) = Cosigned By      Initials Name Provider Type    Adrianne Penny, PT Physical Therapist                                  Outcome Measure Options: FAAM  FAAM  FAAM: 83%      Time Calculation:     Start Time: 0920  Stop Time: 1000  Time Calculation (min): 40 min  Timed Charges  47018 - PT Therapeutic Exercise Minutes: 10  65799 - PT Self Care/Mgmt Minutes: 5  Untimed Charges  PT  Eval/Re-eval Minutes: 25  Total Minutes  Timed Charges Total Minutes: 15  Untimed Charges Total Minutes: 25   Total Minutes: 40     Therapy Charges for Today       Code Description Service Date Service Provider Modifiers Qty    71858740529 HC PT THER PROC EA 15 MIN 2/21/2024 Adrianne Alvarado, PT GP 1    14934843418 HC PT EVAL LOW COMPLEXITY 2 2/21/2024 Adrianne Alvarado, PT GP 1            PT G-Codes  Outcome Measure Options: FAAM         Adrianne Alvarado, PT  2/21/2024

## 2024-02-22 RX ORDER — LEVOTHYROXINE SODIUM 0.1 MG/1
100 TABLET ORAL DAILY
Qty: 90 TABLET | Refills: 1 | Status: SHIPPED | OUTPATIENT
Start: 2024-02-22

## 2024-02-23 ENCOUNTER — HOSPITAL ENCOUNTER (OUTPATIENT)
Dept: PHYSICAL THERAPY | Facility: HOSPITAL | Age: 69
Setting detail: THERAPIES SERIES
Discharge: HOME | End: 2024-02-23
Payer: MEDICARE

## 2024-02-23 DIAGNOSIS — R26.2 DIFFICULTY WALKING: ICD-10-CM

## 2024-02-23 DIAGNOSIS — M79.671 BILATERAL FOOT PAIN: Primary | ICD-10-CM

## 2024-02-23 DIAGNOSIS — M79.672 BILATERAL FOOT PAIN: Primary | ICD-10-CM

## 2024-02-23 PROCEDURE — 97110 THERAPEUTIC EXERCISES: CPT | Performed by: PHYSICAL THERAPIST

## 2024-02-23 NOTE — THERAPY TREATMENT NOTE
Outpatient Physical Therapy Ortho Treatment Note  [facility]     Patient Name: Iris Kumar  : 1955  MRN: [id number]  Today's Date: 2024      Visit Date: 2024    Visit Dx:    ICD-10-CM ICD-9-CM   1. Bilateral foot pain  M79.671 729.5    M79.672    2. Difficulty walking  R26.2 719.7       Patient Active Problem List   Diagnosis    Adult hypothyroidism    Post-menopausal    Tubular adenoma of colon    Sponge kidney    Pure hypercholesterolemia        Past Medical History:   Diagnosis Date    Allergic 1990    Reglan    Cancer cis colon 2005    Cataract surgery     Colon polyp     cancerours    Diverticulosis detected on colonoscopy 3 yrs ago    Heart murmur     mitral valve prolapse    Hypertension     Hypothyroidism     Menopause     Pure hypercholesterolemia 2021        Past Surgical History:   Procedure Laterality Date    EYE SURGERY      cataract    HYSTERECTOMY      TOTAL ABDOMINAL HYSTERECTOMY WITH SALPINGO OOPHORECTOMY                          PT Assessment/Plan       Row Name 24 1059          PT Assessment    Assessment Comments Ms. Kumar returns to the clinic for her first follow up PT session for her bilateral feet pain.  She reports adherence to her HEP and reports that she feels better. She presents to the clinic ambulating without AD, decreased pelvic trunk disassocation, and increased CHUNG.  We reivewed current program with intermittent cuing and progressed toward several standing/functional strengthening/balance activities. Updated HEP accourdingly. Encouraged return to walking, starting out gently at about 10 min, assess response, and progressing with 10% rule as a rule of thumb for pogression.  Ms. Kumar continues to be a good candidate for skilled physical therapy.  -GJ        PT Plan    PT Plan Comments assess response to new exercies, warm up on bike, add bridges, add resistance to lateral stepping, consider monster walk F/B with resistance,  SL clam, ? step down from foam or 4 inch.  step and reach to promote improved pelvic trunk disassociation  -GJ               User Key  (r) = Recorded By, (t) = Taken By, (c) = Cosigned By      Initials Name Provider Type    Reji Stone, PT Physical Therapist                       OP Exercises       Row Name 02/23/24 0921 02/23/24 0900          Subjective    Subjective Comments -- i'm actually feeling a little better  -GJ        Total Minutes    88707 - PT Therapeutic Exercise Minutes 39  -GJ --        Exercise 1    Exercise Name 1 -- recumbent bike  -GJ     Time 1 -- 5 min  -GJ     Additional Comments -- seat 6  -GJ        Exercise 4    Exercise Name 4 -- ankle 3 way  -GJ     Cueing 4 -- Verbal;Demo  -GJ     Reps 4 -- 20 each  -GJ     Time 4 -- GTB  -GJ     Additional Comments -- B  -GJ        Exercise 5    Exercise Name 5 -- standing HR/toe raises  -GJ     Cueing 5 -- Verbal;Demo  -GJ     Reps 5 -- 20  -GJ        Exercise 6    Exercise Name 6 -- lateral stepping  -GJ     Cueing 6 -- Verbal;Demo  -GJ     Reps 6 -- 3 laps  -GJ     Time 6 -- no resistance  -GJ        Exercise 7    Exercise Name 7 -- tandem gait, F/B  -GJ     Cueing 7 -- Verbal;Demo  -GJ     Reps 7 -- 3 laps  -GJ     Time 7 -- 1 hand  -GJ        Exercise 8    Exercise Name 8 -- gastroc stretch  -GJ     Cueing 8 -- Verbal;Demo  -GJ     Reps 8 -- 3  -GJ     Time 8 -- 20 s  -GJ     Additional Comments -- B, from step (one at a time)  -GJ        Exercise 9    Exercise Name 9 -- seated HR  -GJ     Cueing 9 -- Verbal;Demo  -GJ     Reps 9 -- 20  -GJ     Time 9 -- 3s  -GJ               User Key  (r) = Recorded By, (t) = Taken By, (c) = Cosigned By      Initials Name Provider Type    Reji Stone, PT Physical Therapist                                  PT OP Goals       Row Name 02/23/24 0900          PT Short Term Goals    STG 1 Pt will be independent and verbalized good understanding of initial HEP to improve ability to manage pain, as well as  improve ankle strength and mobility.  -GJ     STG 1 Progress Ongoing  -GJ     STG 1 Progress Comments reviewed, intermittent uces  -GJ     STG 2 Patient will report 50% improvement in ambulation tolerance while participating in walking program to demo improved activity tolerance and return to PLOF.  -GJ     STG 2 Progress Ongoing  -GJ        Long Term Goals    LTG 1 Pt will be independent and verbalized good understanding of advance HEP to improve ability to manage pain, as well as improve ankle strength and mobility.  -GJ     LTG 1 Progress Ongoing  -GJ     LTG 2 The pt will demonstrate B ankle DF AROM to at least 5 deg to facilitate improved gait pattern.  -GJ     LTG 2 Progress Ongoing  -GJ     LTG 3 The pt will demonstrate B ankle strength to at least 4+/5 for improved functional strength and balance.  -GJ     LTG 3 Progress Ongoing  -GJ     LTG 4 Patient will report 75% improvement in ambulation tolerance while participating in walking program to demo improved activity tolerance and return to PLOF.  -GJ     LTG 4 Progress Ongoing  -GJ     LTG 5 The pt will score greater than or equal to 93% ability on the FAAM to indicate improved perceived performance of ADLs.  -GJ     LTG 5 Progress Ongoing  -GJ               User Key  (r) = Recorded By, (t) = Taken By, (c) = Cosigned By      Initials Name Provider Type    Reji Stone, PT Physical Therapist                    Therapy Education  Education Details: [id number] reviewed activity modifications, reviewed pacing of activities and 10% rule for progression, especially of walking program  Given: HEP, Symptoms/condition management, Pain management, Posture/body mechanics, Fall prevention and home safety, Mobility training  Program: Reinforced, New, Progressed  How Provided: Verbal, Demonstration, Written  Provided to: Patient  Level of Understanding: Teach back education performed, Verbalized, Demonstrated              Time Calculation:   Start Time: 0921  Stop  Time: 1000  Time Calculation (min): 39 min  Timed Charges  95609 - PT Therapeutic Exercise Minutes: 39  Total Minutes  Timed Charges Total Minutes: 39   Total Minutes: 39  Therapy Charges for Today       Code Description Service Date Service Provider Modifiers Qty    95077223467 HC PT THER PROC EA 15 MIN 2/23/2024 Reji Rogers, PT GP 3                      Reji Rogers, PT  2/23/2024

## 2024-02-29 ENCOUNTER — HOSPITAL ENCOUNTER (OUTPATIENT)
Dept: PHYSICAL THERAPY | Facility: HOSPITAL | Age: 69
Setting detail: THERAPIES SERIES
Discharge: HOME | End: 2024-02-29
Payer: MEDICARE

## 2024-02-29 DIAGNOSIS — M79.672 BILATERAL FOOT PAIN: Primary | ICD-10-CM

## 2024-02-29 DIAGNOSIS — R26.2 DIFFICULTY WALKING: ICD-10-CM

## 2024-02-29 DIAGNOSIS — M79.671 BILATERAL FOOT PAIN: Primary | ICD-10-CM

## 2024-02-29 PROCEDURE — 97110 THERAPEUTIC EXERCISES: CPT | Performed by: PHYSICAL THERAPIST

## 2024-02-29 NOTE — THERAPY TREATMENT NOTE
Outpatient Physical Therapy Ortho Treatment Note  [facility]     Patient Name: Iris Kumar  : 1955  MRN: [id number]  Today's Date: 2024      Visit Date: 2024    Visit Dx:    ICD-10-CM ICD-9-CM   1. Bilateral foot pain  M79.671 729.5    M79.672    2. Difficulty walking  R26.2 719.7       Patient Active Problem List   Diagnosis    Adult hypothyroidism    Post-menopausal    Tubular adenoma of colon    Sponge kidney    Pure hypercholesterolemia        Past Medical History:   Diagnosis Date    Allergic 1990    Reglan    Cancer cis colon 2005    Cataract surgery     Colon polyp     cancerours    Diverticulosis detected on colonoscopy 3 yrs ago    Heart murmur     mitral valve prolapse    Hypertension     Hypothyroidism     Menopause     Pure hypercholesterolemia 2021        Past Surgical History:   Procedure Laterality Date    EYE SURGERY      cataract    HYSTERECTOMY      TOTAL ABDOMINAL HYSTERECTOMY WITH SALPINGO OOPHORECTOMY                          PT Assessment/Plan       Row Name 24 0909          PT Assessment    Assessment Comments Ms. Kumar returns to the clinic reportig improvment in her condition and good adherence to her HEP. She also reports initiation of walking program at the gym, being able to walk about 9 min. or so.  She presents with much improved gait pattern, no AD. She does demosntrate mild early terminal stance on L and grossly sliht loss of DF ROM on the L.  We progressed LE/hip girdle/core strenghening activities today.  Updating HEP accourdingly. She will perform strenghtneing once every other day and stretches/ROM/balance activities daily.  Ms. Kumar is motivated and is progressing towards all functional goals. Ms. Kumar cotinues to be a good candidate for skilled physical therapy.  -GJ        PT Plan    PT Plan Comments assess response to progressed execises. consider adding second set to step down. Likely no new changes for home. ? toe  yoga  -GJ               User Key  (r) = Recorded By, (t) = Taken By, (c) = Cosigned By      Initials Name Provider Type    GJ Reji Rogers, PT Physical Therapist                       OP Exercises       Row Name 02/29/24 0832 02/29/24 0800          Subjective    Subjective Comments -- i think i'm doing better. I'm deconditioned  -GJ        Total Minutes    03817 - PT Therapeutic Exercise Minutes 40  -GJ --        Exercise 1    Exercise Name 1 -- recumbent bike  -GJ     Time 1 -- 5 min  -GJ     Additional Comments -- seat 6  -GJ        Exercise 4    Exercise Name 4 -- ankle 3 way  -GJ     Cueing 4 -- Verbal;Demo  -GJ     Reps 4 -- 20 each  -GJ     Time 4 -- RTB (pulled back to RTB for improve motion)  -GJ     Additional Comments -- B  -GJ        Exercise 5    Exercise Name 5 -- standing HR/toe raises  -GJ     Cueing 5 -- Verbal;Demo  -GJ     Reps 5 -- 20  -GJ        Exercise 6    Exercise Name 6 -- lateral stepping  -GJ     Cueing 6 -- Verbal;Demo  -GJ     Reps 6 -- 3 laps  -GJ     Time 6 -- RTB just distal to  knees  -GJ        Exercise 7    Exercise Name 7 -- tandem gait, F/B  -GJ     Cueing 7 -- Verbal;Demo  -GJ     Reps 7 -- 3 laps  -GJ     Time 7 -- 1 hand  -GJ        Exercise 8    Exercise Name 8 -- gastroc stretch  -GJ     Cueing 8 -- Verbal;Demo  -GJ     Reps 8 -- 3  -GJ     Time 8 -- 20 s  -GJ     Additional Comments -- B, from step (one at a time)  -GJ        Exercise 10    Exercise Name 10 -- monster walk, F/B  -GJ     Cueing 10 -- Verbal;Demo  -GJ     Reps 10 -- 3 laps  -GJ     Time 10 -- RTB, jsut distal knees  -GJ        Exercise 11    Exercise Name 11 -- step down  -GJ     Cueing 11 -- Verbal;Demo  -GJ     Reps 11 -- 10, B  -GJ     Time 11 -- 4 inch step  -GJ     Additional Comments -- 1 hand support  -GJ        Exercise 12    Exercise Name 12 -- bridges  -GJ     Cueing 12 -- Verbal;Demo  -GJ     Time 12 -- 15  -GJ     Additional Comments -- 3s  -GJ        Exercise 13    Exercise Name 13 -- SL clam   -GJ     Cueing 13 -- Verbal;Demo  -GJ     Reps 13 -- 15  -GJ     Time 13 -- 3 s  -GJ     Additional Comments -- RTB  -GJ        Exercise 14    Exercise Name 14 -- step and reach  -GJ     Cueing 14 -- Verbal;Demo  -GJ     Reps 14 -- 10, B, 1 hand support  -GJ               User Key  (r) = Recorded By, (t) = Taken By, (c) = Cosigned By      Initials Name Provider Type    Reji Stone, PT Physical Therapist                                  PT OP Goals       Row Name 02/29/24 0800          PT Short Term Goals    STG 1 Pt will be independent and verbalized good understanding of initial HEP to improve ability to manage pain, as well as improve ankle strength and mobility.  -GJ     STG 1 Progress Met  -GJ     STG 2 Patient will report 50% improvement in ambulation tolerance while participating in walking program to demo improved activity tolerance and return to PLOF.  -GJ     STG 2 Progress Ongoing  -GJ        Long Term Goals    LTG 1 Pt will be independent and verbalized good understanding of advance HEP to improve ability to manage pain, as well as improve ankle strength and mobility.  -GJ     LTG 1 Progress Ongoing  -GJ     LTG 2 The pt will demonstrate B ankle DF AROM to at least 5 deg to facilitate improved gait pattern.  -GJ     LTG 2 Progress Ongoing  -GJ     LTG 3 The pt will demonstrate B ankle strength to at least 4+/5 for improved functional strength and balance.  -GJ     LTG 3 Progress Ongoing  -GJ     LTG 4 Patient will report 75% improvement in ambulation tolerance while participating in walking program to demo improved activity tolerance and return to PLOF.  -GJ     LTG 4 Progress Ongoing  -GJ     LTG 5 The pt will score greater than or equal to 93% ability on the FAAM to indicate improved perceived performance of ADLs.  -GJ     LTG 5 Progress Ongoing  -GJ               User Key  (r) = Recorded By, (t) = Taken By, (c) = Cosigned By      Initials Name Provider Type    Reji Stone, PT Physical  Therapist                    Therapy Education  Education Details: [id number] reviewed activity modifications, strengthening to be once every other day, stretching/ROM/balance activities daily  Given: HEP, Symptoms/condition management, Pain management, Posture/body mechanics, Fall prevention and home safety, Mobility training  Program: Reinforced, New, Progressed  How Provided: Verbal, Demonstration, Written  Provided to: Patient  Level of Understanding: Teach back education performed, Verbalized, Demonstrated              Time Calculation:   Start Time: 0832  Stop Time: 0915  Time Calculation (min): 43 min  Timed Charges  62745 - PT Therapeutic Exercise Minutes: 40  Total Minutes  Timed Charges Total Minutes: 40   Total Minutes: 40  Therapy Charges for Today       Code Description Service Date Service Provider Modifiers Qty    78730453628 HC PT THER PROC EA 15 MIN 2/29/2024 Reji Rogers, PT GP 3                      Reji Rogers, PT  2/29/2024

## 2024-03-05 ENCOUNTER — HOSPITAL ENCOUNTER (OUTPATIENT)
Dept: PHYSICAL THERAPY | Facility: HOSPITAL | Age: 69
Setting detail: THERAPIES SERIES
Discharge: HOME | End: 2024-03-05
Payer: MEDICARE

## 2024-03-05 DIAGNOSIS — M79.672 BILATERAL FOOT PAIN: Primary | ICD-10-CM

## 2024-03-05 DIAGNOSIS — M79.671 BILATERAL FOOT PAIN: Primary | ICD-10-CM

## 2024-03-05 DIAGNOSIS — R26.2 DIFFICULTY WALKING: ICD-10-CM

## 2024-03-05 PROCEDURE — 97110 THERAPEUTIC EXERCISES: CPT | Performed by: PHYSICAL THERAPIST

## 2024-03-05 NOTE — THERAPY TREATMENT NOTE
Outpatient Physical Therapy Ortho Treatment Note  [facility]     Patient Name: Iris Kumar  : 1955  MRN: [id number]  Today's Date: 3/5/2024      Visit Date: 2024    Visit Dx:    ICD-10-CM ICD-9-CM   1. Bilateral foot pain  M79.671 729.5    M79.672    2. Difficulty walking  R26.2 719.7       Patient Active Problem List   Diagnosis    Adult hypothyroidism    Post-menopausal    Tubular adenoma of colon    Sponge kidney    Pure hypercholesterolemia        Past Medical History:   Diagnosis Date    Allergic 1990    Reglan    Cancer cis colon 2005    Cataract surgery     Colon polyp     cancerours    Diverticulosis detected on colonoscopy 3 yrs ago    Heart murmur     mitral valve prolapse    Hypertension     Hypothyroidism     Menopause     Pure hypercholesterolemia 2021        Past Surgical History:   Procedure Laterality Date    EYE SURGERY      cataract    HYSTERECTOMY      TOTAL ABDOMINAL HYSTERECTOMY WITH SALPINGO OOPHORECTOMY                          PT Assessment/Plan       Row Name 24 0838          PT Assessment    Assessment Comments Ms. Kumar returns to the clinic reporting an ability to ambulate 9K steps yesterday without exacerbation of her foot pain. She demonstrates significant improvment in her gait pattern with noted improvment in pelvic/trunk disassociation. We progressed sets/reps with step down activity, added balance activities with SLS shoulder extensions (with kick stand).  She has met 2 of 2 STG's.  She fatigued as the session progressed, resulting in mild limp while she left.  Unsure if this is secondary to hip girdle weakness or slightly limited DF ROM leading to early terminal stance. Will assess next session. Ms. Kumar continuees to be a good candidate for skilled physical therpay.  -GJ        PT Plan    PT Plan Comments asses DF ROM bilaterally, asses hip abd strength bilaterally consider toe yoga, continue to strenghten hip girdle and work  on SLS activities  -GJ               User Key  (r) = Recorded By, (t) = Taken By, (c) = Cosigned By      Initials Name Provider Type    GJ Reji Rogers, PT Physical Therapist                       OP Exercises       Row Name 03/05/24 0828 03/05/24 0800          Subjective    Subjective Comments -- I'm doing great, I walked 9000 steps yesterday and did ok.  -GJ        Total Minutes    21798 - PT Therapeutic Exercise Minutes 40  -GJ --        Exercise 1    Exercise Name 1 -- recumbent bike  -GJ     Time 1 -- 5 min  -GJ     Additional Comments -- seat 6  -GJ        Exercise 4    Exercise Name 4 -- ankle 3 way  -GJ     Cueing 4 -- Verbal;Demo  -GJ     Reps 4 -- 20 each  -GJ     Time 4 -- RTB (pulled back to RTB for improve motion)  -GJ     Additional Comments -- B  -GJ        Exercise 5    Exercise Name 5 -- standing HR/toe raises  -GJ     Cueing 5 -- Verbal;Demo  -GJ     Reps 5 -- 20  -GJ        Exercise 6    Exercise Name 6 -- lateral stepping  -GJ     Cueing 6 -- Verbal;Demo  -GJ     Reps 6 -- 3 laps  -GJ     Time 6 -- RTB just distal to  knees  -GJ     Additional Comments -- out of bars, no UE/s  -GJ        Exercise 7    Exercise Name 7 -- tandem gait, F/B  -GJ     Cueing 7 -- Verbal;Demo  -GJ     Reps 7 -- 3 laps  -GJ     Time 7 -- minimal UE support  -GJ        Exercise 8    Exercise Name 8 -- gastroc stretch  -GJ     Cueing 8 -- Verbal;Demo  -GJ     Reps 8 -- 3  -GJ     Time 8 -- 20 s  -GJ        Exercise 10    Exercise Name 10 -- monster walk, F/B  -GJ     Cueing 10 -- Verbal;Demo  -GJ     Reps 10 -- 3 laps  -GJ     Time 10 -- RTB, jsut distal knees  -GJ     Additional Comments -- out of bars, no UE/s  -GJ        Exercise 11    Exercise Name 11 -- step down  -GJ     Cueing 11 -- Verbal;Demo  -GJ     Sets 11 -- 2  -GJ     Reps 11 -- 10B  -GJ     Time 11 -- 4 inch step  -GJ     Additional Comments -- 1 hand support  -GJ        Exercise 12    Exercise Name 12 -- bridges  -GJ     Cueing 12 -- --  -GJ     Time 12  -- --  -GJ     Additional Comments -- next sessoin  -GJ        Exercise 13    Exercise Name 13 -- SL clam  -GJ     Cueing 13 -- --  -GJ     Reps 13 -- --  -GJ     Time 13 -- --  -GJ     Additional Comments -- next session  -GJ        Exercise 14    Exercise Name 14 -- step and reach  -GJ     Cueing 14 -- Verbal;Demo  -GJ     Sets 14 -- 2  -GJ     Reps 14 -- 10, B 1 hand  -GJ        Exercise 15    Exercise Name 15 -- toe yoga  -GJ     Additional Comments -- next session  -GJ        Exercise 16    Exercise Name 16 -- SLS shoulder ext (with kick stand)  -GJ     Cueing 16 -- Verbal;Demo  -GJ     Sets 16 -- 2, each  -GJ     Reps 16 -- 10  -GJ     Time 16 -- RTB  -GJ               User Key  (r) = Recorded By, (t) = Taken By, (c) = Cosigned By      Initials Name Provider Type    Reji Stone, PT Physical Therapist                                  PT OP Goals       Row Name 03/05/24 0800          PT Short Term Goals    STG 1 Pt will be independent and verbalized good understanding of initial HEP to improve ability to manage pain, as well as improve ankle strength and mobility.  -GJ     STG 1 Progress Met  -GJ     STG 2 Patient will report 50% improvement in ambulation tolerance while participating in walking program to demo improved activity tolerance and return to PLOF.  -GJ     STG 2 Progress Met  -GJ     STG 2 Progress Comments 80%  -GJ        Long Term Goals    LTG 1 Pt will be independent and verbalized good understanding of advance HEP to improve ability to manage pain, as well as improve ankle strength and mobility.  -GJ     LTG 1 Progress Ongoing  -GJ     LTG 2 The pt will demonstrate B ankle DF AROM to at least 5 deg to facilitate improved gait pattern.  -GJ     LTG 2 Progress Ongoing  -GJ     LTG 3 The pt will demonstrate B ankle strength to at least 4+/5 for improved functional strength and balance.  -GJ     LTG 3 Progress Ongoing  -GJ     LTG 4 Patient will report 75% improvement in ambulation tolerance  while participating in walking program to demo improved activity tolerance and return to PLOF.  -     LTG 4 Progress Ongoing  -     LTG 5 The pt will score greater than or equal to 93% ability on the FAAM to indicate improved perceived performance of ADLs.  -     LTG 5 Progress Ongoing  -               User Key  (r) = Recorded By, (t) = Taken By, (c) = Cosigned By      Initials Name Provider Type     Reji Rogers, PT Physical Therapist                    Therapy Education  Education Details: reviewed activity modifications  Given: HEP, Symptoms/condition management, Pain management, Posture/body mechanics, Fall prevention and home safety, Mobility training  Program: Reinforced  How Provided: Verbal, Demonstration  Provided to: Patient  Level of Understanding: Teach back education performed, Verbalized, Demonstrated              Time Calculation:   Start Time: 0830  Stop Time: 0915  Time Calculation (min): 45 min  Timed Charges  82860 - PT Therapeutic Exercise Minutes: 40  Total Minutes  Timed Charges Total Minutes: 40   Total Minutes: 40  Therapy Charges for Today       Code Description Service Date Service Provider Modifiers Qty    65927710732 HC PT THER PROC EA 15 MIN 3/5/2024 Reij Rogers, PT GP 3                      Reji Rogers PT  3/5/2024

## 2024-03-07 ENCOUNTER — HOSPITAL ENCOUNTER (OUTPATIENT)
Dept: PHYSICAL THERAPY | Facility: HOSPITAL | Age: 69
Setting detail: THERAPIES SERIES
Discharge: HOME | End: 2024-03-07
Payer: MEDICARE

## 2024-03-07 DIAGNOSIS — M79.672 BILATERAL FOOT PAIN: Primary | ICD-10-CM

## 2024-03-07 DIAGNOSIS — R26.2 DIFFICULTY WALKING: ICD-10-CM

## 2024-03-07 DIAGNOSIS — M79.671 BILATERAL FOOT PAIN: Primary | ICD-10-CM

## 2024-03-07 PROCEDURE — 97110 THERAPEUTIC EXERCISES: CPT

## 2024-03-07 NOTE — THERAPY TREATMENT NOTE
Outpatient Physical Therapy Ortho Treatment Note  [facility]     Patient Name: Iris Kumar  : 1955  MRN: [id number]  Today's Date: 3/7/2024      Visit Date: 2024    Visit Dx:    ICD-10-CM ICD-9-CM   1. Bilateral foot pain  M79.671 729.5    M79.672    2. Difficulty walking  R26.2 719.7       Patient Active Problem List   Diagnosis    Adult hypothyroidism    Post-menopausal    Tubular adenoma of colon    Sponge kidney    Pure hypercholesterolemia        Past Medical History:   Diagnosis Date    Allergic 1990    Reglan    Cancer cis colon 2005    Cataract surgery     Colon polyp     cancerours    Diverticulosis detected on colonoscopy 3 yrs ago    Heart murmur     mitral valve prolapse    Hypertension     Hypothyroidism     Menopause     Pure hypercholesterolemia 2021        Past Surgical History:   Procedure Laterality Date    EYE SURGERY      cataract    HYSTERECTOMY      TOTAL ABDOMINAL HYSTERECTOMY WITH SALPINGO OOPHORECTOMY                          PT Assessment/Plan       Row Name 24 1500          PT Assessment    Assessment Comments Ms. Kumar arrives to PT reporting ongoing improvement in B foot pain. She decreased her step count since last session due to mild inc in pain at 9k steps. She remains eager to return to PLOF and walking program. Continued with previous exercise routine with addition of step ups onto foam and tandem stance + AR press with good tolerance. Deferred ankle 3 way to HEP only and verbalized adjustment. Ms. Kumar remains a candidate for skilled PT.  -JS        PT Plan    PT Plan Comments assess DF ROM and B hip strength, consider mini squats onto foam?  -JEAN MARIE               User Key  (r) = Recorded By, (t) = Taken By, (c) = Cosigned By      Initials Name Provider Type    Adrianne Penny, PT Physical Therapist                       OP Exercises       Row Name 24 1000             Subjective    Subjective Comments Things are feeling  good and I am getting better  -JS         Total Minutes    89508 - PT Therapeutic Exercise Minutes 40  -JS         Exercise 1    Exercise Name 1 Nustep  -JS      Time 1 5 mins  -JS      Additional Comments bike taken  -JS         Exercise 5    Exercise Name 5 standing HR/toe raises  -JS      Cueing 5 Verbal;Demo  -JS      Reps 5 20  -JS         Exercise 6    Exercise Name 6 lateral stepping  -JS      Cueing 6 Verbal;Demo  -JS      Reps 6 3 laps  -JS      Time 6 RTB just distal to  knees  -JS      Additional Comments out of bars, no UE/s  -JS         Exercise 7    Exercise Name 7 tandem gait, F/B  -JS      Cueing 7 Verbal;Demo  -JS      Reps 7 3 laps  -JS      Time 7 minimal UE support  -JS         Exercise 8    Exercise Name 8 gastroc stretch  -JS      Cueing 8 Verbal;Demo  -JS      Reps 8 3  -JS      Time 8 20 s  -JS         Exercise 9    Exercise Name 9 AR press with tandem stance  -JS      Cueing 9 Verbal;Demo  -JS      Sets 9 2  -JS      Reps 9 10  -JS      Time 9 RTB  -JS         Exercise 10    Exercise Name 10 monster walk, F/B  -JS      Cueing 10 Verbal;Demo  -JS      Reps 10 3 laps  -JS      Time 10 RTB, jsut distal knees  -JS      Additional Comments out of bars, no UE/s  -JS         Exercise 11    Exercise Name 11 step down  -JS      Cueing 11 Verbal;Demo  -JS      Sets 11 2  -JS      Reps 11 10B  -JS      Time 11 4 inch step  -JS      Additional Comments 1 hand support  -JS         Exercise 12    Exercise Name 12 bridges  -JS      Cueing 12 Verbal;Demo  -JS      Time 12 15  -JS         Exercise 13    Exercise Name 13 SL clam  -JS      Cueing 13 Verbal;Demo  -JS      Reps 13 15  -JS      Time 13 3 s  -JS      Additional Comments RTB  -JS         Exercise 14    Exercise Name 14 step and reach  -JS      Cueing 14 Verbal;Demo  -JS      Sets 14 2  -JS      Reps 14 10, B 1 hand  -JS         Exercise 15    Exercise Name 15 step up + kne   -JS      Cueing 15 Verbal;Demo  -JS      Sets 15 2  -JS      Reps 15  10e  -JS      Time 15 4in + foam  -JS         Exercise 16    Exercise Name 16 SLS shoulder ext (with kick stand)  -JS      Cueing 16 Verbal;Demo  -JS      Sets 16 2, each  -JS      Reps 16 10  -JS      Time 16 RTB  -JS                User Key  (r) = Recorded By, (t) = Taken By, (c) = Cosigned By      Initials Name Provider Type    Adrianne Penny, PT Physical Therapist                                  PT OP Goals       Row Name 03/07/24 1000          PT Short Term Goals    STG 1 Pt will be independent and verbalized good understanding of initial HEP to improve ability to manage pain, as well as improve ankle strength and mobility.  -JS     STG 1 Progress Met  -JS     STG 2 Patient will report 50% improvement in ambulation tolerance while participating in walking program to demo improved activity tolerance and return to PLOF.  -JS     STG 2 Progress Met  -JS        Long Term Goals    LTG 1 Pt will be independent and verbalized good understanding of advance HEP to improve ability to manage pain, as well as improve ankle strength and mobility.  -JS     LTG 1 Progress Ongoing  -JS     LTG 2 The pt will demonstrate B ankle DF AROM to at least 5 deg to facilitate improved gait pattern.  -JS     LTG 2 Progress Ongoing  -JS     LTG 3 The pt will demonstrate B ankle strength to at least 4+/5 for improved functional strength and balance.  -JS     LTG 3 Progress Ongoing  -JS     LTG 4 Patient will report 75% improvement in ambulation tolerance while participating in walking program to demo improved activity tolerance and return to PLOF.  -JS     LTG 4 Progress Ongoing  -JS     LTG 5 The pt will score greater than or equal to 93% ability on the FAAM to indicate improved perceived performance of ADLs.  -JS     LTG 5 Progress Ongoing  -JS               User Key  (r) = Recorded By, (t) = Taken By, (c) = Cosigned By      Initials Name Provider Type    Adrianne Penny, PT Physical Therapist                                    Time Calculation:   Start Time: 1050  Stop Time: 1130  Time Calculation (min): 40 min  Timed Charges  77482 - PT Therapeutic Exercise Minutes: 40  Total Minutes  Timed Charges Total Minutes: 40   Total Minutes: 40  Therapy Charges for Today       Code Description Service Date Service Provider Modifiers Qty    25301989702 HC PT THER PROC EA 15 MIN 3/7/2024 Adrianne Alvarado, PT GP 3                      Adrianne Alvarado, PT  3/7/2024

## 2024-03-13 ENCOUNTER — HOSPITAL ENCOUNTER (OUTPATIENT)
Dept: PHYSICAL THERAPY | Facility: HOSPITAL | Age: 69
Setting detail: THERAPIES SERIES
Discharge: HOME | End: 2024-03-13
Payer: MEDICARE

## 2024-03-13 DIAGNOSIS — R26.2 DIFFICULTY WALKING: ICD-10-CM

## 2024-03-13 DIAGNOSIS — M79.671 BILATERAL FOOT PAIN: Primary | ICD-10-CM

## 2024-03-13 DIAGNOSIS — M79.672 BILATERAL FOOT PAIN: Primary | ICD-10-CM

## 2024-03-13 PROCEDURE — 97110 THERAPEUTIC EXERCISES: CPT

## 2024-03-13 NOTE — THERAPY TREATMENT NOTE
Outpatient Physical Therapy Ortho Treatment Note  [facility]     Patient Name: Iris Kumar  : 1955  MRN: [id number]  Today's Date: 3/13/2024      Visit Date: 2024    Visit Dx:    ICD-10-CM ICD-9-CM   1. Bilateral foot pain  M79.671 729.5    M79.672    2. Difficulty walking  R26.2 719.7       Patient Active Problem List   Diagnosis    Adult hypothyroidism    Post-menopausal    Tubular adenoma of colon    Sponge kidney    Pure hypercholesterolemia        Past Medical History:   Diagnosis Date    Allergic 1990    Reglan    Cancer cis colon 2005    Cataract surgery     Colon polyp     cancerours    Diverticulosis detected on colonoscopy 3 yrs ago    Heart murmur     mitral valve prolapse    Hypertension     Hypothyroidism     Menopause     Pure hypercholesterolemia 2021        Past Surgical History:   Procedure Laterality Date    EYE SURGERY      cataract    HYSTERECTOMY      TOTAL ABDOMINAL HYSTERECTOMY WITH SALPINGO OOPHORECTOMY                          PT Assessment/Plan       Row Name 24 1100          PT Assessment    Assessment Comments Ms. Kumar arrives to PT reporting increasing her step count since last session and denies increased pain/soreness. She now reports overall 85% improvement since the start of PT and overall pleased with her progress. Continued with current routine address B ankle strength/stability with mild progressions. Updated HEP, reviewed changes with patient and provided handout. She is now appropriate to decrease frequency to 1x/wk for 2-3 additional sessions prior to transitioning to independent management. Iris Kumar remains a candidate for skilled PT.  -JS        PT Plan    PT Plan Comments add AR press in tandem to HEP and consider mini squats on foam  -JEAN MARIE               User Key  (r) = Recorded By, (t) = Taken By, (c) = Cosigned By      Initials Name Provider Type    Adrianne Penny, PT Physical Therapist                        OP Exercises       Row Name 03/13/24 1100             Subjective    Subjective Comments I am feeling about 85% better and I am slowly increasing my step count  -JS         Total Minutes    51705 - PT Therapeutic Exercise Minutes 40  -JS         Exercise 1    Exercise Name 1 recumbent bike  -JS      Time 1 5 min  -JS      Additional Comments seat 6  -JS         Exercise 5    Exercise Name 5 standing HR/toe raises  -JS      Cueing 5 Verbal;Demo  -JS      Reps 5 20  -JS         Exercise 6    Exercise Name 6 lateral stepping  -JS      Cueing 6 Verbal;Demo  -JS      Reps 6 3 laps  -JS      Time 6 RTB just distal to  knees  -JS      Additional Comments out of bars, no UE/s  -JS         Exercise 7    Exercise Name 7 tandem gait, F/B  -JS      Cueing 7 Verbal;Demo  -JS      Reps 7 3 laps  -JS      Time 7 minimal UE support  -JS         Exercise 8    Exercise Name 8 gastroc stretch  -JS      Cueing 8 Verbal;Demo  -JS      Reps 8 3  -JS      Time 8 20 s  -JS         Exercise 9    Exercise Name 9 AR press with tandem stance  -JS      Cueing 9 Verbal;Demo  -JS      Sets 9 2  -JS      Reps 9 10  -JS      Time 9 RTB  -JS         Exercise 10    Exercise Name 10 monster walk, F/B  -JS      Cueing 10 Verbal;Demo  -JS      Reps 10 3 laps  -JS      Time 10 RTB, jsut distal knees  -JS      Additional Comments out of bars, no UE/s  -JS         Exercise 11    Exercise Name 11 step down  -JS      Cueing 11 Verbal;Demo  -JS      Sets 11 2  -JS      Reps 11 10B  -JS      Time 11 4 inch step  -JS      Additional Comments 1 hand support  -JS         Exercise 12    Exercise Name 12 bridges  -JS      Additional Comments discussed for HEP  -JS         Exercise 13    Exercise Name 13 SL clam  -JS      Additional Comments discussed for HEP  -JS         Exercise 15    Exercise Name 15 step up + kne   -JS      Cueing 15 Verbal;Demo  -JS      Sets 15 2  -JS      Reps 15 10e  -JS      Time 15 6in + foam  -JS         Exercise 16     Exercise Name 16 SLS shoulder ext (with kick stand)  -JS      Cueing 16 Verbal;Demo  -JS      Sets 16 1  -JS      Reps 16 15e  -JS      Time 16 RTB  -JS                User Key  (r) = Recorded By, (t) = Taken By, (c) = Cosigned By      Initials Name Provider Type    Adrianne Penny, PT Physical Therapist                                  PT OP Goals       Row Name 03/13/24 1100          PT Short Term Goals    STG 1 Pt will be independent and verbalized good understanding of initial HEP to improve ability to manage pain, as well as improve ankle strength and mobility.  -JS     STG 1 Progress Met  -JS     STG 2 Patient will report 50% improvement in ambulation tolerance while participating in walking program to demo improved activity tolerance and return to PLOF.  -JS     STG 2 Progress Met  -JS        Long Term Goals    LTG 1 Pt will be independent and verbalized good understanding of advance HEP to improve ability to manage pain, as well as improve ankle strength and mobility.  -JS     LTG 1 Progress Ongoing  -JS     LTG 2 The pt will demonstrate B ankle DF AROM to at least 5 deg to facilitate improved gait pattern.  -JS     LTG 2 Progress Ongoing  -JS     LTG 3 The pt will demonstrate B ankle strength to at least 4+/5 for improved functional strength and balance.  -JS     LTG 3 Progress Ongoing  -JS     LTG 4 Patient will report 75% improvement in ambulation tolerance while participating in walking program to demo improved activity tolerance and return to PLOF.  -JS     LTG 4 Progress Ongoing  -JS     LTG 5 The pt will score greater than or equal to 93% ability on the FAAM to indicate improved perceived performance of ADLs.  -JS     LTG 5 Progress Ongoing  -JS               User Key  (r) = Recorded By, (t) = Taken By, (c) = Cosigned By      Initials Name Provider Type    Adrianne Penny, PT Physical Therapist                    Therapy Education  Education Details: updated HEP  Given: HEP,  Symptoms/condition management, Pain management, Posture/body mechanics  Program: Reinforced, Progressed  How Provided: Verbal, Demonstration, Written  Provided to: Patient  Level of Understanding: Verbalized, Demonstrated              Time Calculation:   Start Time: 1052  Stop Time: 1132  Time Calculation (min): 40 min  Timed Charges  25239 - PT Therapeutic Exercise Minutes: 40  Total Minutes  Timed Charges Total Minutes: 40   Total Minutes: 40  Therapy Charges for Today       Code Description Service Date Service Provider Modifiers Qty    61086201413  PT THER PROC EA 15 MIN 3/13/2024 Adrianne Alvarado, PT GP 3                      Adrianne Alvarado, PT  3/13/2024

## 2024-03-15 ENCOUNTER — APPOINTMENT (OUTPATIENT)
Dept: PHYSICAL THERAPY | Facility: HOSPITAL | Age: 69
End: 2024-03-15
Payer: MEDICARE

## 2024-03-19 ENCOUNTER — APPOINTMENT (OUTPATIENT)
Dept: PHYSICAL THERAPY | Facility: HOSPITAL | Age: 69
End: 2024-03-19
Payer: MEDICARE

## 2024-03-21 ENCOUNTER — HOSPITAL ENCOUNTER (OUTPATIENT)
Dept: PHYSICAL THERAPY | Facility: HOSPITAL | Age: 69
Setting detail: THERAPIES SERIES
Discharge: HOME | End: 2024-03-21
Payer: MEDICARE

## 2024-03-21 DIAGNOSIS — M79.671 BILATERAL FOOT PAIN: Primary | ICD-10-CM

## 2024-03-21 DIAGNOSIS — R26.2 DIFFICULTY WALKING: ICD-10-CM

## 2024-03-21 DIAGNOSIS — M79.672 BILATERAL FOOT PAIN: Primary | ICD-10-CM

## 2024-03-21 PROCEDURE — 97110 THERAPEUTIC EXERCISES: CPT

## 2024-03-21 NOTE — THERAPY DISCHARGE NOTE
Outpatient Physical Therapy Ortho Progress Note/Discharge Summary  [facility]     Patient Name: Iris Kumar  : 1955  MRN: [id number]  Today's Date: 3/21/2024      Visit Date: 2024    Visit Dx:    ICD-10-CM ICD-9-CM   1. Bilateral foot pain  M79.671 729.5    M79.672    2. Difficulty walking  R26.2 719.7       Patient Active Problem List   Diagnosis    Adult hypothyroidism    Post-menopausal    Tubular adenoma of colon    Sponge kidney    Pure hypercholesterolemia        Past Medical History:   Diagnosis Date    Allergic     Reglan    Cancer cis colon     Cataract surgery     Colon polyp     cancerours    Diverticulosis detected on colonoscopy 3 yrs ago    Heart murmur     mitral valve prolapse    Hypertension     Hypothyroidism     Menopause     Pure hypercholesterolemia 2021        Past Surgical History:   Procedure Laterality Date    EYE SURGERY      cataract    HYSTERECTOMY      TOTAL ABDOMINAL HYSTERECTOMY WITH SALPINGO OOPHORECTOMY          PT Ortho       Row Name 24 1100       Right Lower Ext    Rt Ankle Dorsiflexion AROM 6 deg  -JS       Left Lower Ext    Lt Ankle Dorsiflexion AROM 3 deg  -JS       MMT Right Lower Ext    Rt Ankle Dorsiflexion MMT, Gross Movement (5/5) normal  -JS    Rt Ankle Subtalar Inversion MT, Gross Movement (4+/5) good plus  -JS    Rt Ankle Subtalar Eversion MMT, Gross Movement (5/5) normal  -JS       MMT Left Lower Ext    Lt Ankle Dorsiflexion MMT, Gross Movement (5/5) normal  -JS    Lt Ankle Subtalar Inversion MMT, Gross Movement (4+/5) good plus  -JS    Lt Ankle Subtalar Eversion MMT, Gross Movement (4+/5) good plus  -JS              User Key  (r) = Recorded By, (t) = Taken By, (c) = Cosigned By      Initials Name Provider Type    Adrianne Penny, PT Physical Therapist                                 PT Assessment/Plan       Row Name 24 1100          PT Assessment    Assessment Comments Iris Kumar was seen  for 7 physical therapy sessions for B foot pain. Patient reports overall 95% improvement since the start of PT. She now feels her step count has returned to 75% from her PLOF and slowly increasing her distance by 1% each day. She expresses consistent HEP compliance and performing her stretches daily and strengthening every other day. Throughout her POC, treatment included therapeutic exercise and patient education with home exercise program . Progress to physical therapy goals was good. Pt met 2/2 STG and met/partially met 4/5 LTG. Patient demonstrated improvement in B ankle ROM/strength (see ortho tab for updated measurements). Her FAAM score also improved from 83% to 90% ability, allowing her to nearly meet her remaining LTG. Time spent discussing advanced HEP and appropriate progressions/modifications to make based on response following discharge and optimal frequency/duration to complete HEP over next several weeks-months. Patient verbalized understanding. She was discharged to an independent HEP and provided patient education to self-manage condition.  -JS        PT Plan    PT Plan Comments discharged  -JS               User Key  (r) = Recorded By, (t) = Taken By, (c) = Cosigned By      Initials Name Provider Type    Adrianne Penny, PT Physical Therapist                         OP Exercises       Row Name 03/21/24 1100             Subjective    Subjective Comments I am feeling really good  -JS         Total Minutes    85394 - PT Therapeutic Exercise Minutes 25  -JS         Exercise 1    Exercise Name 1 recumbent bike  -JS      Time 1 5 min  -JS         Exercise 2    Exercise Name 2 TIme spent filling out surveys, taking ROM/MMT, discussing goals/POC and advanced HEP  -JS      Time 2 20 mins  -JS                User Key  (r) = Recorded By, (t) = Taken By, (c) = Cosigned By      Initials Name Provider Type    Adrianne Penny, PT Physical Therapist                                    PT OP Goals        Row Name 03/21/24 1000          PT Short Term Goals    STG 1 Pt will be independent and verbalized good understanding of initial HEP to improve ability to manage pain, as well as improve ankle strength and mobility.  -JS     STG 1 Progress Met  -JS     STG 2 Patient will report 50% improvement in ambulation tolerance while participating in walking program to demo improved activity tolerance and return to PLOF.  -JS     STG 2 Progress Met  -JS        Long Term Goals    LTG 1 Pt will be independent and verbalized good understanding of advance HEP to improve ability to manage pain, as well as improve ankle strength and mobility.  -JS     LTG 1 Progress Met  -JS     LTG 2 The pt will demonstrate B ankle DF AROM to at least 5 deg to facilitate improved gait pattern.  -JS     LTG 2 Progress Partially Met  -JS     LTG 2 Progress Comments see ortho  -JS     LTG 3 The pt will demonstrate B ankle strength to at least 4+/5 for improved functional strength and balance.  -JS     LTG 3 Progress Met  -JS     LTG 3 Progress Comments see ortho  -JS     LTG 4 Patient will report 75% improvement in ambulation tolerance while participating in walking program to demo improved activity tolerance and return to PLOF.  -JS     LTG 4 Progress Met  -JS     LTG 4 Progress Comments 90%  -JS     LTG 5 The pt will score greater than or equal to 93% ability on the FAAM to indicate improved perceived performance of ADLs.  -JS     LTG 5 Progress Not Met  -JS     LTG 5 Progress Comments 90%  -JS               User Key  (r) = Recorded By, (t) = Taken By, (c) = Cosigned By      Initials Name Provider Type    Adrianne Penny, PT Physical Therapist                    Therapy Education  Education Details: finalized HEP  Given: HEP, Symptoms/condition management, Pain management, Posture/body mechanics  Program: Reinforced, Progressed  How Provided: Verbal, Demonstration, Written  Provided to: Patient  Level of Understanding: Verbalized,  Demonstrated    Outcome Measure Options: FAAM  FAAM  FAAM: 90%      Time Calculation:   Start Time: 1050  Stop Time: 1115  Time Calculation (min): 25 min  Timed Charges  56211 - PT Therapeutic Exercise Minutes: 25  Total Minutes  Timed Charges Total Minutes: 25   Total Minutes: 25  Therapy Charges for Today       Code Description Service Date Service Provider Modifiers Qty    86229633120 HC PT THER PROC EA 15 MIN 3/21/2024 Adrianne Alvarado, PT GP 2            PT G-Codes  Outcome Measure Options: FAAM     OP PT Discharge Summary  Date of Discharge: 03/21/24  Reason for Discharge: Maximum functional potential achieved  Outcomes Achieved: Patient able to partially acheive established goals  Discharge Destination: Home with home program      Adrianne Alvarado, PT  3/21/2024

## 2024-04-22 NOTE — TELEPHONE ENCOUNTER
Rx Refill Note  Requested Prescriptions     Pending Prescriptions Disp Refills    amLODIPine (NORVASC) 5 MG tablet [Pharmacy Med Name: AMLODIPINE BESYLATE 5 MG TAB] 90 tablet 1     Sig: TAKE 1 TABLET BY MOUTH EVERY DAY      Last office visit with prescribing clinician: 2/12/2024   Last telemedicine visit with prescribing clinician: Visit date not found   Next office visit with prescribing clinician: 8/12/2024                         Would you like a call back once the refill request has been completed: [] Yes [] No    If the office needs to give you a call back, can they leave a voicemail: [] Yes [] No    Macario Oneal Rep  04/22/24, 09:01 EDT

## 2024-04-23 RX ORDER — AMLODIPINE BESYLATE 5 MG/1
5 TABLET ORAL DAILY
Qty: 90 TABLET | Refills: 1 | Status: SHIPPED | OUTPATIENT
Start: 2024-04-23

## 2024-07-21 DIAGNOSIS — E78.00 PURE HYPERCHOLESTEROLEMIA: Chronic | ICD-10-CM

## 2024-07-22 RX ORDER — ROSUVASTATIN CALCIUM 10 MG/1
10 TABLET, COATED ORAL DAILY
Qty: 90 TABLET | Refills: 1 | Status: SHIPPED | OUTPATIENT
Start: 2024-07-22

## 2024-07-22 NOTE — TELEPHONE ENCOUNTER
Rx Refill Note  Requested Prescriptions     Pending Prescriptions Disp Refills    rosuvastatin (CRESTOR) 10 MG tablet [Pharmacy Med Name: ROSUVASTATIN CALCIUM 10 MG TAB] 90 tablet 1     Sig: TAKE 1 TABLET BY MOUTH EVERY DAY      Last office visit with prescribing clinician: 2/12/2024   Last telemedicine visit with prescribing clinician: Visit date not found   Next office visit with prescribing clinician: 8/12/2024                         Would you like a call back once the refill request has been completed: [] Yes [] No    If the office needs to give you a call back, can they leave a voicemail: [] Yes [] No    Macario Oneal Rep  07/22/24, 09:16 EDT

## 2024-08-12 ENCOUNTER — OFFICE VISIT (OUTPATIENT)
Dept: FAMILY MEDICINE CLINIC | Facility: CLINIC | Age: 69
End: 2024-08-12
Payer: MEDICARE

## 2024-08-12 VITALS
WEIGHT: 186.1 LBS | SYSTOLIC BLOOD PRESSURE: 120 MMHG | HEIGHT: 68 IN | HEART RATE: 68 BPM | DIASTOLIC BLOOD PRESSURE: 80 MMHG | RESPIRATION RATE: 14 BRPM | OXYGEN SATURATION: 97 % | TEMPERATURE: 97.6 F | BODY MASS INDEX: 28.2 KG/M2

## 2024-08-12 DIAGNOSIS — E03.9 ADULT HYPOTHYROIDISM: ICD-10-CM

## 2024-08-12 DIAGNOSIS — Z00.00 ENCOUNTER FOR ANNUAL WELLNESS VISIT (AWV) IN MEDICARE PATIENT: Primary | ICD-10-CM

## 2024-08-12 DIAGNOSIS — I10 PRIMARY HYPERTENSION: ICD-10-CM

## 2024-08-12 PROBLEM — I34.1 NONRHEUMATIC MITRAL (VALVE) PROLAPSE: Status: ACTIVE | Noted: 2024-08-12

## 2024-08-12 PROCEDURE — 1159F MED LIST DOCD IN RCRD: CPT | Performed by: NURSE PRACTITIONER

## 2024-08-12 PROCEDURE — 1126F AMNT PAIN NOTED NONE PRSNT: CPT | Performed by: NURSE PRACTITIONER

## 2024-08-12 PROCEDURE — G0439 PPPS, SUBSEQ VISIT: HCPCS | Performed by: NURSE PRACTITIONER

## 2024-08-12 PROCEDURE — 1160F RVW MEDS BY RX/DR IN RCRD: CPT | Performed by: NURSE PRACTITIONER

## 2024-08-12 RX ORDER — LEVOTHYROXINE SODIUM 100 UG/1
100 TABLET ORAL DAILY
Qty: 90 TABLET | Refills: 1 | Status: SHIPPED | OUTPATIENT
Start: 2024-08-12

## 2024-08-12 RX ORDER — MULTIPLE VITAMINS W/ MINERALS TAB 9MG-400MCG
1 TAB ORAL DAILY
COMMUNITY

## 2024-08-12 RX ORDER — AMLODIPINE BESYLATE 5 MG/1
5 TABLET ORAL DAILY
Qty: 90 TABLET | Refills: 1 | Status: SHIPPED | OUTPATIENT
Start: 2024-08-12

## 2024-08-12 NOTE — PROGRESS NOTES
The ABCs of the Annual Wellness Visit  Subsequent Medicare Wellness Visit    Subjective    Iris Kumar is a 68 y.o. female who presents for a Subsequent Medicare Wellness Visit.    The following portions of the patient's history were reviewed and   updated as appropriate: allergies, current medications, past family history, past medical history, past social history, past surgical history, and problem list.    Compared to one year ago, the patient feels her physical   health is better.    Compared to one year ago, the patient feels her mental   health is better.    Recent Hospitalizations:  She was not admitted to the hospital during the last year.       Current Medical Providers:  Patient Care Team:  Jenni Styles APRN as PCP - General (Nurse Practitioner)  Loco Rojas MD (Inactive) as Obstetrician (Obstetrics and Gynecology)  Theresa Leblanc MD (Gastroenterology)    Outpatient Medications Prior to Visit   Medication Sig Dispense Refill    calcium carbonate (OS-HECTOR) 600 MG tablet Take 500 mg by mouth 2 (Two) Times a Day With Meals.      Cholecalciferol (VITAMIN D-3) 1000 UNITS capsule Take 2,000 Units by mouth Daily With Dinner. Pt takes 2000units daily and 5000units on Sunday      rosuvastatin (CRESTOR) 10 MG tablet TAKE 1 TABLET BY MOUTH EVERY DAY 90 tablet 1    sodium bicarbonate 325 MG tablet Take 1 tablet by mouth 2 (Two) Times a Day.      amLODIPine (NORVASC) 5 MG tablet TAKE 1 TABLET BY MOUTH EVERY DAY 90 tablet 1    levothyroxine (SYNTHROID, LEVOTHROID) 100 MCG tablet TAKE 1 TABLET BY MOUTH EVERY DAY 90 tablet 1    multivitamin with minerals tablet tablet Take 1 tablet by mouth Daily.       No facility-administered medications prior to visit.       No opioid medication identified on active medication list. I have reviewed chart for other potential  high risk medication/s and harmful drug interactions in the elderly.        Aspirin is not on active medication list.  Aspirin use is not  "indicated based on review of current medical condition/s. Risk of harm outweighs potential benefits.  .    Patient Active Problem List   Diagnosis    Adult hypothyroidism    Post-menopausal    Tubular adenoma of colon    Sponge kidney    Pure hypercholesterolemia    Nonrheumatic mitral (valve) prolapse     Advance Care Planning   Advance Care Planning     Advance Directive is not on file.  ACP discussion was held with the patient during this visit. Patient has an advance directive (not in EMR), copy requested.     Objective    Vitals:    24 0959   BP: 120/80   Pulse: 68   Resp: 14   Temp: 97.6 °F (36.4 °C)   TempSrc: Temporal   SpO2: 97%   Weight: 84.4 kg (186 lb 1.6 oz)   Height: 172.7 cm (68\")   PainSc: 0-No pain     Estimated body mass index is 28.3 kg/m² as calculated from the following:    Height as of this encounter: 172.7 cm (68\").    Weight as of this encounter: 84.4 kg (186 lb 1.6 oz).           Does the patient have evidence of cognitive impairment? No    Lab Results   Component Value Date    CHLPL 153 2024    TRIG 103 2024    HDL 52 2024    LDL 82 2024    VLDL 19 2024        HEALTH RISK ASSESSMENT    Smoking Status:  Social History     Tobacco Use   Smoking Status Former    Current packs/day: 0.00    Average packs/day: 1 pack/day for 16.0 years (16.0 ttl pk-yrs)    Types: Cigarettes    Start date:     Quit date:     Years since quittin.6    Passive exposure: Never   Smokeless Tobacco Never   Tobacco Comments    quit      Alcohol Consumption:  Social History     Substance and Sexual Activity   Alcohol Use Not Currently     Fall Risk Screen:    STEADI Fall Risk Assessment was completed, and patient is at LOW risk for falls.Assessment completed on:2024    Depression Screenin/12/2024     1:00 PM   PHQ-2/PHQ-9 Depression Screening   Little Interest or Pleasure in Doing Things 0-->not at all   Feeling Down, Depressed or Hopeless 0-->not at all "   Trouble Falling or Staying Asleep, or Sleeping Too Much 0-->not at all   Feeling Tired or Having Little Energy 0-->not at all   Poor Appetite or Overeating 0-->not at all   Feeling Bad about Yourself - or that You are a Failure or Have Let Yourself or Your Family Down 0-->not at all   Trouble Concentrating on Things, Such as Reading the Newspaper or Watching Television 0-->not at all   Moving or Speaking So Slowly that Other People Could Have Noticed? Or the Opposite - Being So Fidgety 0-->not at all   Thoughts that You Would be Better Off Dead or of Hurting Yourself in Some Way 0-->not at all   PHQ-9: Brief Depression Severity Measure Score 0       Health Habits and Functional and Cognitive Screenin/6/2024     3:26 PM   Functional & Cognitive Status   Do you have difficulty preparing food and eating? No   Do you have difficulty bathing yourself, getting dressed or grooming yourself? No   Do you have difficulty using the toilet? No   Do you have difficulty moving around from place to place? No   Do you have trouble with steps or getting out of a bed or a chair? No   Current Diet Limited Junk Food   Dental Exam Up to date   Eye Exam Up to date   Exercise (times per week) 3 times per week   Current Exercises Include Walking;Weightlifting;Yard Work   Do you need help using the phone?  No   Are you deaf or do you have serious difficulty hearing?  No   Do you need help to go to places out of walking distance? No   Do you need help shopping? No   Do you need help preparing meals?  No   Do you need help with housework?  No   Do you need help with laundry? No   Do you need help taking your medications? No   Do you need help managing money? No   Do you ever drive or ride in a car without wearing a seat belt? No   Have you felt unusual stress, anger or loneliness in the last month? No   Who do you live with? Alone   If you need help, do you have trouble finding someone available to you? No   Have you been bothered  in the last four weeks by sexual problems? No   Do you have difficulty concentrating, remembering or making decisions? No       Age-appropriate Screening Schedule:  Refer to the list below for future screening recommendations based on patient's age, sex and/or medical conditions. Orders for these recommended tests are listed in the plan section. The patient has been provided with a written plan.    Health Maintenance   Topic Date Due    INFLUENZA VACCINE  08/01/2024    COVID-19 Vaccine (5 - 2023-24 season) 12/31/2024 (Originally 9/1/2023)    BMI FOLLOWUP  02/12/2025    DXA SCAN  02/27/2025    LIPID PANEL  08/05/2025    ANNUAL WELLNESS VISIT  08/12/2025    MAMMOGRAM  08/08/2026    COLORECTAL CANCER SCREENING  08/16/2026    TDAP/TD VACCINES (2 - Td or Tdap) 04/06/2027    HEPATITIS C SCREENING  Completed    Pneumococcal Vaccine 65+  Completed    ZOSTER VACCINE  Addressed    PAP SMEAR  Discontinued                  CMS Preventative Services Quick Reference  Risk Factors Identified During Encounter  Immunizations Discussed/Encouraged: Influenza and COVID19  The above risks/problems have been discussed with the patient.  Pertinent information has been shared with the patient in the After Visit Summary.  An After Visit Summary and PPPS were made available to the patient.    Follow Up:   Next Medicare Wellness visit to be scheduled in 1 year.       Additional E&M Note during same encounter follows:  Patient has multiple medical problems which are significant and separately identifiable that require additional work above and beyond the Medicare Wellness Visit.      Chief Complaint  Medicare Wellness-subsequent    Subjective        HPI  Iris Kumar is also being seen today for awv only    History of Present Illness  The patient is here for an Annual Wellness Visit (AWV).    She has recently increased her daily step count to 2 miles and is considering increasing this to 5 miles daily. Her physical and mental health have  "improved, with no overnight hospitalizations in the past year. She visits a skin group annually and due to a tick bite, she was prescribed antibiotics. She is scheduled for a mammogram next week at [facility] and is not currently taking baby aspirin. She has an advanced directive or living will in place. She reports some memory lapses. Her colonoscopy is up to date, with 3 polyps found last year and a carcinoma in situ at age 49. She is considering pills over-the-counter for bowel preparation. Her last bone density scan was in 2023, which showed improvement, and she is due for another in 2025. She has osteopenia and is walking regularly. Her current medications include sodium bicarbonate twice daily, rosuvastatin 10 mg, levothyroxine, amlodipine, calcium carbonate, vitamin D, and a multivitamin, though not daily. Her thyroid levels are stable, and she denies unusual fatigue or heart palpitations. She monitors her blood pressure at home, which ranges from 126 to 132. She denies shortness of breath, cough, chest pain, or heart palpitations. She occasionally experiences skipped heart beats, but is unsure of the cause. She denies abdominal symptoms, nausea, vomiting, diarrhea, or changes in stool characteristics. She experienced an episode of constipation while traveling to [place], which she attributes to sitting in a car for extended periods. She denies ear problems or allergies, but occasionally sneezes. She regularly visits the dentist. She denies difficulty swallowing, occasional acid reflux, urinary issues, headaches, or dizziness. She has annual skin examinations due to a tick bite. She has not received the COVID-19 booster since 12/20/2022.            Objective   Vital Signs:  /80   Pulse 68   Temp 97.6 °F (36.4 °C) (Temporal)   Resp 14   Ht 172.7 cm (68\")   Wt 84.4 kg (186 lb 1.6 oz)   SpO2 97%   BMI 28.30 kg/m²     Physical Exam  Vitals and nursing note reviewed.   Constitutional:       General: " She is not in acute distress.     Appearance: She is well-developed. She is not ill-appearing.   HENT:      Head: Normocephalic and atraumatic.      Right Ear: Tympanic membrane, ear canal and external ear normal.      Left Ear: Tympanic membrane, ear canal and external ear normal.      Mouth/Throat:      Mouth: Mucous membranes are moist.      Pharynx: Uvula midline. No posterior oropharyngeal erythema.   Eyes:      General: No scleral icterus.        Right eye: No discharge.         Left eye: No discharge.      Conjunctiva/sclera: Conjunctivae normal.   Neck:      Thyroid: No thyromegaly.   Cardiovascular:      Rate and Rhythm: Normal rate and regular rhythm.      Heart sounds: Normal heart sounds.   Pulmonary:      Effort: Pulmonary effort is normal.      Breath sounds: Normal breath sounds.   Abdominal:      General: Bowel sounds are normal. There is no distension.      Palpations: Abdomen is soft.      Tenderness: There is no abdominal tenderness.   Musculoskeletal:         General: No deformity.      Cervical back: Neck supple.      Comments: Gait smooth and steady   Lymphadenopathy:      Cervical: No cervical adenopathy.   Skin:     General: Skin is warm and dry.   Neurological:      General: No focal deficit present.      Mental Status: She is alert and oriented to person, place, and time.   Psychiatric:         Mood and Affect: Mood normal.         Behavior: Behavior normal.         Physical Exam      Vital Signs  Blood pressure is 120/80.                Results  Laboratory Studies  Glucose is 97. No protein in urine. Thyroid levels are therapeutic. Total cholesterol is 153, triglycerides are 103, HDL cholesterol is good, and LDL is 82. Creatinine, electrolytes, and calcium levels are normal. Liver function is normal. CBC is normal.              Assessment and Plan   Diagnoses and all orders for this visit:    1. Encounter for annual wellness visit (AWV) in Medicare patient (Primary)    2. Adult  hypothyroidism  -     levothyroxine (SYNTHROID, LEVOTHROID) 100 MCG tablet; Take 1 tablet by mouth Daily.  Dispense: 90 tablet; Refill: 1    3. Primary hypertension  -     amLODIPine (NORVASC) 5 MG tablet; Take 1 tablet by mouth Daily.  Dispense: 90 tablet; Refill: 1        Assessment & Plan  1. Annual wellness visit.  No protein was detected in her urine. Thyroid function is therapeutic. Total cholesterol is 153, triglycerides are 103, HDL cholesterol is satisfactory, and LDL is 82. Glucose levels have improved from 99 to 97. Creatinine, electrolytes, and calcium levels are all within normal limits. Liver function is normal. CBC is normal. The importance of brain stimulation and increased heart rate for 30 minutes, 5 days a week was emphasized. A daily multivitamin was recommended. She was advised to bring her advanced directive to her next visit. A bone density scan is due in 2025. She was advised to receive the influenza and COVID-19 vaccines this fall.     Appropriate health maintenance and prevention topics specific for this patient were discussed today.  Additionally, health goals, and health concerns addressed as appropriate.  Pt was encouraged to stay up to date on recommended screenings and vaccines based on USPSTF guidelines.      Blood pressure and hypothyroid are stable and will continue current meds.          Follow Up   No follow-ups on file.  Patient was given instructions and counseling regarding her condition or for health maintenance advice. Please see specific information pulled into the AVS if appropriate.    Patient or patient representative verbalized consent for the use of Ambient Listening during the visit with  KAITLIN Araujo for chart documentation. 8/26/2024  06:20 EDT

## 2024-08-15 ENCOUNTER — TELEPHONE (OUTPATIENT)
Dept: FAMILY MEDICINE CLINIC | Facility: CLINIC | Age: 69
End: 2024-08-15
Payer: MEDICARE

## 2024-08-15 DIAGNOSIS — Z12.31 ENCOUNTER FOR SCREENING MAMMOGRAM FOR MALIGNANT NEOPLASM OF BREAST: Primary | ICD-10-CM

## 2024-08-15 NOTE — TELEPHONE ENCOUNTER
Caller: TY    Relationship: Other--[facility]    Best call back number: [phone]         What was the call regarding:     PATIENT IS SCHEDULED TO GET A SCREENING MAMMOGRAM AND THEY HAVE NOT GOTTEN THE ORDERS.    TO THE PATIENT IS SCHEDULED TO GET THIS DONE ON 8/20/2024      PLEASE RETURN HER CALL.

## 2025-01-23 DIAGNOSIS — E78.00 PURE HYPERCHOLESTEROLEMIA: Chronic | ICD-10-CM

## 2025-01-23 NOTE — TELEPHONE ENCOUNTER
Rx Refill Note  Requested Prescriptions     Pending Prescriptions Disp Refills    rosuvastatin (CRESTOR) 10 MG tablet [Pharmacy Med Name: ROSUVASTATIN CALCIUM 10 MG TAB] 90 tablet 1     Sig: TAKE 1 TABLET BY MOUTH EVERY DAY      Last office visit with prescribing clinician: 8/12/2024   Last telemedicine visit with prescribing clinician: Visit date not found   Next office visit with prescribing clinician: 2/13/2025                         Would you like a call back once the refill request has been completed: [] Yes [] No    If the office needs to give you a call back, can they leave a voicemail: [] Yes [] No    Macario Oneal Rep  01/23/25, 12:00 EST

## 2025-01-24 RX ORDER — ROSUVASTATIN CALCIUM 10 MG/1
10 TABLET, COATED ORAL DAILY
Qty: 90 TABLET | Refills: 0 | Status: SHIPPED | OUTPATIENT
Start: 2025-01-24

## 2025-02-10 DIAGNOSIS — E03.9 ADULT HYPOTHYROIDISM: ICD-10-CM

## 2025-02-10 NOTE — TELEPHONE ENCOUNTER
Rx Refill Note  Requested Prescriptions     Pending Prescriptions Disp Refills    levothyroxine (SYNTHROID, LEVOTHROID) 100 MCG tablet [Pharmacy Med Name: LEVOTHYROXINE 100 MCG TABLET] 90 tablet 1     Sig: TAKE 1 TABLET BY MOUTH EVERY DAY      Last office visit with prescribing clinician: 8/12/2024   Last telemedicine visit with prescribing clinician: Visit date not found   Next office visit with prescribing clinician: 2/13/2025                         Would you like a call back once the refill request has been completed: [] Yes [] No    If the office needs to give you a call back, can they leave a voicemail: [] Yes [] No    Macario Oneal Rep  02/10/25, 10:38 EST

## 2025-02-11 RX ORDER — LEVOTHYROXINE SODIUM 100 UG/1
100 TABLET ORAL DAILY
Qty: 90 TABLET | Refills: 1 | Status: SHIPPED | OUTPATIENT
Start: 2025-02-11

## 2025-02-13 ENCOUNTER — OFFICE VISIT (OUTPATIENT)
Dept: FAMILY MEDICINE CLINIC | Facility: CLINIC | Age: 70
End: 2025-02-13
Payer: MEDICARE

## 2025-02-13 VITALS
TEMPERATURE: 97.7 F | RESPIRATION RATE: 12 BRPM | OXYGEN SATURATION: 98 % | BODY MASS INDEX: 28.16 KG/M2 | HEIGHT: 68 IN | HEART RATE: 78 BPM | WEIGHT: 185.8 LBS | SYSTOLIC BLOOD PRESSURE: 122 MMHG | DIASTOLIC BLOOD PRESSURE: 72 MMHG

## 2025-02-13 DIAGNOSIS — Z12.31 ENCOUNTER FOR SCREENING MAMMOGRAM FOR MALIGNANT NEOPLASM OF BREAST: ICD-10-CM

## 2025-02-13 DIAGNOSIS — M85.80 OSTEOPENIA AFTER MENOPAUSE: Chronic | ICD-10-CM

## 2025-02-13 DIAGNOSIS — I10 PRIMARY HYPERTENSION: Primary | Chronic | ICD-10-CM

## 2025-02-13 DIAGNOSIS — E78.00 PURE HYPERCHOLESTEROLEMIA: Chronic | ICD-10-CM

## 2025-02-13 DIAGNOSIS — E03.9 ADULT HYPOTHYROIDISM: Chronic | ICD-10-CM

## 2025-02-13 DIAGNOSIS — Z78.0 OSTEOPENIA AFTER MENOPAUSE: Chronic | ICD-10-CM

## 2025-02-13 DIAGNOSIS — Q61.5 SPONGE KIDNEY: Chronic | ICD-10-CM

## 2025-02-13 LAB
ALBUMIN SERPL-MCNC: 4.2 G/DL (ref 3.5–5.2)
ALBUMIN/GLOB SERPL: 1.6 G/DL
ALP SERPL-CCNC: 84 U/L (ref 39–117)
ALT SERPL-CCNC: 12 U/L (ref 1–33)
AST SERPL-CCNC: 19 U/L (ref 1–32)
BILIRUB SERPL-MCNC: 0.4 MG/DL (ref 0–1.2)
BUN SERPL-MCNC: 21 MG/DL (ref 8–23)
BUN/CREAT SERPL: 22.8 (ref 7–25)
CALCIUM SERPL-MCNC: 10.1 MG/DL (ref 8.6–10.5)
CHLORIDE SERPL-SCNC: 104 MMOL/L (ref 98–107)
CO2 SERPL-SCNC: 25.7 MMOL/L (ref 22–29)
CREAT SERPL-MCNC: 0.92 MG/DL (ref 0.57–1)
EGFRCR SERPLBLD CKD-EPI 2021: 67.5 ML/MIN/1.73
GLOBULIN SER CALC-MCNC: 2.7 GM/DL
GLUCOSE SERPL-MCNC: 97 MG/DL (ref 65–99)
POTASSIUM SERPL-SCNC: 4.3 MMOL/L (ref 3.5–5.2)
PROT SERPL-MCNC: 6.9 G/DL (ref 6–8.5)
SODIUM SERPL-SCNC: 139 MMOL/L (ref 136–145)

## 2025-02-13 PROCEDURE — G2211 COMPLEX E/M VISIT ADD ON: HCPCS | Performed by: NURSE PRACTITIONER

## 2025-02-13 PROCEDURE — 1159F MED LIST DOCD IN RCRD: CPT | Performed by: NURSE PRACTITIONER

## 2025-02-13 PROCEDURE — 99214 OFFICE O/P EST MOD 30 MIN: CPT | Performed by: NURSE PRACTITIONER

## 2025-02-13 PROCEDURE — 1126F AMNT PAIN NOTED NONE PRSNT: CPT | Performed by: NURSE PRACTITIONER

## 2025-02-13 PROCEDURE — 1160F RVW MEDS BY RX/DR IN RCRD: CPT | Performed by: NURSE PRACTITIONER

## 2025-02-13 NOTE — PROGRESS NOTES
Chief Complaint  Primary hypertension    Subjective        Iris Kumar presents to [facility]  History of Present Illness    History of Present Illness  The patient is a 69-year-old female who presents for evaluation of sleep issues, osteopenia, and medullary sponge kidney.    She reports no significant health concerns, with the exception of minor sleep disturbances. She has a history of good sleep hygiene, typically sleeping between 6 to 7.5 hours per night without interruptions. Recently, she has experienced awakenings at night but is able to return to sleep promptly. She feels adequately rested and occasionally supplements her sleep with daytime naps. She uses melatonin sparingly, approximately twice a month, when she perceives a deficit in her rest. She does not report any unusual fatigue, muscle aches, or pains. She also does not experience any cardiac symptoms such as palpitations or shortness of breath, and reports no dizziness.    She has been managing her osteopenia with calcium and vitamin D supplements, and has recently resumed weight-bearing exercises. She expresses willingness to consider medication for osteoporosis prevention if indicated on bone density screening.    She continues to take sodium bicarbonate, a regimen initiated in her 20s following a diagnosis of medullary sponge kidney. She recalls an episode of microscopic hematuria approximately 10 years ago, which was evaluated with a CT scan and found to be normal. She was advised to follow up in 6 months, but this was not completed due to the onset of the COVID-19 pandemic. Her renal function remains stable.    Supplemental Information  She had a severe gastrointestinal virus in November, which caused dizziness and a fall. She experienced vomiting and diarrhea for 3 days. After 3 days, she was able to take fluids and then started eating. She did not walk very much for a while. She went out of town for  "Thanksgiving and then the holidays. She always gradually builds up her walking to prevent foot pain after delay in walking. She is walking for 45 minutes now. She does not report any unusual fatigue with that or muscle aches or pains that are unexpected. She does not feel like her heart can not keep up with it. She does not report any dizziness. She does not report any shortness of breath or cough. She does not report any chest pain or heart palpitations. Her abdominal symptoms have resolved. She does not report any nausea, vomiting, or diarrhea. She does not report any trouble swallowing. Her stools are back to normal.    FAMILY HISTORY  Her sister has osteoporosis.    MEDICATIONS  Current       Objective   Vital Signs:  /72   Pulse 78   Temp 97.7 °F (36.5 °C) (Infrared)   Resp 12   Ht 172.7 cm (68\")   Wt 84.3 kg (185 lb 12.8 oz)   SpO2 98%   BMI 28.25 kg/m²   Estimated body mass index is 28.25 kg/m² as calculated from the following:    Height as of this encounter: 172.7 cm (68\").    Weight as of this encounter: 84.3 kg (185 lb 12.8 oz).       BMI is >= 25 and <30. (Overweight) The following options were offered after discussion;: Information on healthy weight added to patient's after visit summary.      Physical Exam  Vitals and nursing note reviewed.   Constitutional:       General: She is not in acute distress.     Appearance: She is well-developed. She is not ill-appearing or diaphoretic.      Comments: Well-dressed, well-appearing   HENT:      Head: Normocephalic and atraumatic.      Right Ear: Tympanic membrane, ear canal and external ear normal.      Left Ear: Tympanic membrane, ear canal and external ear normal.      Mouth/Throat:      Mouth: Mucous membranes are moist.      Pharynx: No posterior oropharyngeal erythema.   Eyes:      General:         Right eye: No discharge.         Left eye: No discharge.      Conjunctiva/sclera: Conjunctivae normal.   Cardiovascular:      Rate and Rhythm: " Normal rate and regular rhythm.   Pulmonary:      Effort: Pulmonary effort is normal.      Breath sounds: Normal breath sounds.   Abdominal:      General: Bowel sounds are normal. There is no distension.      Palpations: Abdomen is soft.      Tenderness: There is no abdominal tenderness.   Musculoskeletal:         General: No deformity.      Cervical back: Neck supple.      Comments: Gait smooth and steady   Lymphadenopathy:      Cervical: No cervical adenopathy.   Skin:     General: Skin is warm and dry.   Neurological:      General: No focal deficit present.      Mental Status: She is alert and oriented to person, place, and time.   Psychiatric:         Mood and Affect: Mood normal.         Behavior: Behavior normal.          Physical Exam       Result Review :            Results                  Assessment and Plan     Diagnoses and all orders for this visit:    1. Primary hypertension (Primary)  -     Comprehensive metabolic panel    2. Pure hypercholesterolemia    3. Adult hypothyroidism    4. Sponge kidney  -     Comprehensive metabolic panel    5. Osteopenia after menopause  -     DEXA Bone Density Axial; Future    6. Encounter for screening mammogram for malignant neoplasm of breast  -     Mammo Screening Digital Tomosynthesis Bilateral With CAD; Future        Assessment & Plan  1. Sleep issues.  She reports mild sleep disturbances, including waking up during the night but being able to return to sleep. She occasionally takes melatonin a couple of times a month when needed. It was discussed that postmenopausal changes and decreased melatonin production with age could contribute to these issues. No new treatment is required at this time.    2. Osteopenia.  She has a history of osteopenia and takes calcium and vitamin D supplements. She has recently resumed weight-bearing exercises and walking. A bone density scan will be scheduled for August 2024 at [facility]. If the results indicate worsening, she is open to  starting medication, including options like Prolia injections or oral medications.    3. Medullary sponge kidney.  She has a long-standing diagnosis of medullary sponge kidney and continues to take sodium bicarbonate as prescribed by her nephrologist. Kidney function and electrolyte levels will be checked to ensure continued stability.    4.  Hypertension-stable  Continue current amlodipine.  Will check CMP today.     5.  Hyperlipidemia-stable  Continue current statin.  No refills needed today.    6.  Hypothyroidism-stable  Will continue current levothyroxine.  Her most recent TSH was stable    Health maintenance.  A mammogram and DEXA scan will be scheduled for August 2024 at [facility]. Her cholesterol and thyroid levels were previously normal, so no immediate re-testing is required.            Follow Up     No follow-ups on file.  Patient was given instructions and counseling regarding her condition or for health maintenance advice. Please see specific information pulled into the AVS if appropriate.    Patient or patient representative verbalized consent for the use of Ambient Listening during the visit with  KAITLIN Araujo for chart documentation. 2/13/2025  17:49 EST

## 2025-03-06 ENCOUNTER — TELEPHONE (OUTPATIENT)
Dept: FAMILY MEDICINE CLINIC | Facility: CLINIC | Age: 70
End: 2025-03-06
Payer: MEDICARE

## 2025-03-06 NOTE — TELEPHONE ENCOUNTER
HUB RELAY:  Left  w/ patient adv Jenni Styles will be out of office every Friday starting 4/10/2025. Adv to call back or make new appt via my chart. I am also sending a messg via my chart

## 2025-04-12 DIAGNOSIS — I10 PRIMARY HYPERTENSION: ICD-10-CM

## 2025-04-14 RX ORDER — AMLODIPINE BESYLATE 5 MG/1
5 TABLET ORAL DAILY
Qty: 90 TABLET | Refills: 1 | Status: SHIPPED | OUTPATIENT
Start: 2025-04-14

## 2025-04-14 NOTE — TELEPHONE ENCOUNTER
Rx Refill Note  Requested Prescriptions     Pending Prescriptions Disp Refills    amLODIPine (NORVASC) 5 MG tablet [Pharmacy Med Name: AMLODIPINE BESYLATE 5 MG TAB] 90 tablet 1     Sig: TAKE 1 TABLET BY MOUTH EVERY DAY      Last office visit with prescribing clinician: 2/13/2025   Last telemedicine visit with prescribing clinician: Visit date not found   Next office visit with prescribing clinician: 8/18/2025                         Would you like a call back once the refill request has been completed: [] Yes [] No    If the office needs to give you a call back, can they leave a voicemail: [] Yes [] No    Josep Matthew MA  04/14/25, 09:38 EDT

## 2025-05-13 DIAGNOSIS — E78.00 PURE HYPERCHOLESTEROLEMIA: Chronic | ICD-10-CM

## 2025-05-14 RX ORDER — ROSUVASTATIN CALCIUM 10 MG/1
10 TABLET, COATED ORAL DAILY
Qty: 90 TABLET | Refills: 1 | Status: SHIPPED | OUTPATIENT
Start: 2025-05-14

## 2025-05-14 NOTE — TELEPHONE ENCOUNTER
Rx Refill Note  Requested Prescriptions     Pending Prescriptions Disp Refills    rosuvastatin (CRESTOR) 10 MG tablet [Pharmacy Med Name: ROSUVASTATIN CALCIUM 10 MG TAB] 90 tablet 1     Sig: TAKE 1 TABLET BY MOUTH EVERY DAY      Last office visit with prescribing clinician: 2/13/2025   Last telemedicine visit with prescribing clinician: Visit date not found   Next office visit with prescribing clinician: 8/18/2025                         Would you like a call back once the refill request has been completed: [] Yes [] No    If the office needs to give you a call back, can they leave a voicemail: [] Yes [] No    Macario Oneal Rep  05/14/25, 08:43 EDT

## 2025-08-07 ENCOUNTER — EXTERNAL PBMM DATA (OUTPATIENT)
Dept: PHARMACY | Facility: OTHER | Age: 70
End: 2025-08-07
Payer: MEDICARE

## 2025-08-11 DIAGNOSIS — E03.9 ADULT HYPOTHYROIDISM: ICD-10-CM

## 2025-08-11 RX ORDER — LEVOTHYROXINE SODIUM 100 UG/1
100 TABLET ORAL DAILY
Qty: 90 TABLET | Refills: 0 | Status: SHIPPED | OUTPATIENT
Start: 2025-08-11

## 2025-08-18 ENCOUNTER — OFFICE VISIT (OUTPATIENT)
Dept: FAMILY MEDICINE CLINIC | Facility: CLINIC | Age: 70
End: 2025-08-18
Payer: MEDICARE

## 2025-08-18 VITALS
HEIGHT: 68 IN | RESPIRATION RATE: 17 BRPM | SYSTOLIC BLOOD PRESSURE: 112 MMHG | DIASTOLIC BLOOD PRESSURE: 64 MMHG | OXYGEN SATURATION: 97 % | TEMPERATURE: 97.8 F | WEIGHT: 186.6 LBS | HEART RATE: 75 BPM | BODY MASS INDEX: 28.28 KG/M2

## 2025-08-18 DIAGNOSIS — E03.9 ADULT HYPOTHYROIDISM: Chronic | ICD-10-CM

## 2025-08-18 DIAGNOSIS — Z78.0 OSTEOPENIA AFTER MENOPAUSE: Chronic | ICD-10-CM

## 2025-08-18 DIAGNOSIS — I10 PRIMARY HYPERTENSION: Chronic | ICD-10-CM

## 2025-08-18 DIAGNOSIS — E78.00 PURE HYPERCHOLESTEROLEMIA: Chronic | ICD-10-CM

## 2025-08-18 DIAGNOSIS — R79.89 LOW VITAMIN D LEVEL: ICD-10-CM

## 2025-08-18 DIAGNOSIS — R73.9 HYPERGLYCEMIA: ICD-10-CM

## 2025-08-18 DIAGNOSIS — M85.80 OSTEOPENIA AFTER MENOPAUSE: Chronic | ICD-10-CM

## 2025-08-18 DIAGNOSIS — Z00.00 ENCOUNTER FOR ANNUAL WELLNESS VISIT (AWV) IN MEDICARE PATIENT: Primary | ICD-10-CM

## 2025-08-18 PROCEDURE — G0439 PPPS, SUBSEQ VISIT: HCPCS | Performed by: NURSE PRACTITIONER

## 2025-08-18 PROCEDURE — 1126F AMNT PAIN NOTED NONE PRSNT: CPT | Performed by: NURSE PRACTITIONER

## 2025-08-18 PROCEDURE — 1170F FXNL STATUS ASSESSED: CPT | Performed by: NURSE PRACTITIONER

## 2025-08-18 PROCEDURE — G2211 COMPLEX E/M VISIT ADD ON: HCPCS | Performed by: NURSE PRACTITIONER

## 2025-08-18 PROCEDURE — 99214 OFFICE O/P EST MOD 30 MIN: CPT | Performed by: NURSE PRACTITIONER

## 2025-08-18 PROCEDURE — 1160F RVW MEDS BY RX/DR IN RCRD: CPT | Performed by: NURSE PRACTITIONER

## 2025-08-18 PROCEDURE — 1159F MED LIST DOCD IN RCRD: CPT | Performed by: NURSE PRACTITIONER

## 2025-08-18 RX ORDER — AMLODIPINE BESYLATE 5 MG/1
5 TABLET ORAL DAILY
Qty: 90 TABLET | Refills: 1 | Status: SHIPPED | OUTPATIENT
Start: 2025-08-18